# Patient Record
Sex: FEMALE | Race: BLACK OR AFRICAN AMERICAN | NOT HISPANIC OR LATINO | URBAN - METROPOLITAN AREA
[De-identification: names, ages, dates, MRNs, and addresses within clinical notes are randomized per-mention and may not be internally consistent; named-entity substitution may affect disease eponyms.]

---

## 2023-03-09 ENCOUNTER — TELEPHONE (OUTPATIENT)
Dept: ORTHOPEDICS | Facility: CLINIC | Age: 53
End: 2023-03-09
Payer: COMMERCIAL

## 2023-03-09 DIAGNOSIS — M79.659 PAIN OF THIGH, UNSPECIFIED LATERALITY: Primary | ICD-10-CM

## 2023-03-10 ENCOUNTER — OFFICE VISIT (OUTPATIENT)
Dept: ORTHOPEDICS | Facility: CLINIC | Age: 53
End: 2023-03-10
Payer: COMMERCIAL

## 2023-03-10 ENCOUNTER — HOSPITAL ENCOUNTER (OUTPATIENT)
Dept: RADIOLOGY | Facility: HOSPITAL | Age: 53
Discharge: HOME | End: 2023-03-10
Attending: ORTHOPAEDIC SURGERY
Payer: COMMERCIAL

## 2023-03-10 VITALS — BODY MASS INDEX: 44.41 KG/M2 | HEIGHT: 68 IN | WEIGHT: 293 LBS

## 2023-03-10 DIAGNOSIS — G89.29 CHRONIC PAIN OF LEFT KNEE: ICD-10-CM

## 2023-03-10 DIAGNOSIS — G89.29 CHRONIC PAIN OF LEFT KNEE: Primary | ICD-10-CM

## 2023-03-10 DIAGNOSIS — M17.12 PRIMARY OSTEOARTHRITIS OF LEFT KNEE: ICD-10-CM

## 2023-03-10 DIAGNOSIS — E66.01 CLASS 3 SEVERE OBESITY WITH SERIOUS COMORBIDITY AND BODY MASS INDEX (BMI) OF 45.0 TO 49.9 IN ADULT, UNSPECIFIED OBESITY TYPE: ICD-10-CM

## 2023-03-10 DIAGNOSIS — M25.562 CHRONIC PAIN OF LEFT KNEE: Primary | ICD-10-CM

## 2023-03-10 DIAGNOSIS — M25.562 CHRONIC PAIN OF LEFT KNEE: ICD-10-CM

## 2023-03-10 DIAGNOSIS — M79.659 PAIN OF THIGH, UNSPECIFIED LATERALITY: ICD-10-CM

## 2023-03-10 DIAGNOSIS — M76.32 ILIOTIBIAL BAND SYNDROME OF LEFT SIDE: ICD-10-CM

## 2023-03-10 PROCEDURE — 73552 X-RAY EXAM OF FEMUR 2/>: CPT | Mod: 26,50,, | Performed by: RADIOLOGY

## 2023-03-10 PROCEDURE — 73552 X-RAY EXAM OF FEMUR 2/>: CPT | Mod: TC,50

## 2023-03-10 PROCEDURE — 20610 DRAIN/INJ JOINT/BURSA W/O US: CPT | Mod: LT,S$GLB,, | Performed by: ORTHOPAEDIC SURGERY

## 2023-03-10 PROCEDURE — 73564 X-RAY EXAM KNEE 4 OR MORE: CPT | Mod: TC,LT

## 2023-03-10 PROCEDURE — 1159F PR MEDICATION LIST DOCUMENTED IN MEDICAL RECORD: ICD-10-PCS | Mod: CPTII,S$GLB,, | Performed by: ORTHOPAEDIC SURGERY

## 2023-03-10 PROCEDURE — 73564 X-RAY EXAM KNEE 4 OR MORE: CPT | Mod: 26,LT,, | Performed by: RADIOLOGY

## 2023-03-10 PROCEDURE — 99204 PR OFFICE/OUTPT VISIT, NEW, LEVL IV, 45-59 MIN: ICD-10-PCS | Mod: 25,S$GLB,, | Performed by: ORTHOPAEDIC SURGERY

## 2023-03-10 PROCEDURE — 99204 OFFICE O/P NEW MOD 45 MIN: CPT | Mod: 25,S$GLB,, | Performed by: ORTHOPAEDIC SURGERY

## 2023-03-10 PROCEDURE — 20610 LARGE JOINT ASPIRATION/INJECTION: L KNEE: ICD-10-PCS | Mod: LT,S$GLB,, | Performed by: ORTHOPAEDIC SURGERY

## 2023-03-10 PROCEDURE — 73564 XR KNEE ORTHO LEFT WITH FLEXION: ICD-10-PCS | Mod: 26,LT,, | Performed by: RADIOLOGY

## 2023-03-10 PROCEDURE — 3008F BODY MASS INDEX DOCD: CPT | Mod: CPTII,S$GLB,, | Performed by: ORTHOPAEDIC SURGERY

## 2023-03-10 PROCEDURE — 73562 X-RAY EXAM OF KNEE 3: CPT | Mod: 26,RT,, | Performed by: RADIOLOGY

## 2023-03-10 PROCEDURE — 99999 PR PBB SHADOW E&M-EST. PATIENT-LVL IV: ICD-10-PCS | Mod: PBBFAC,,, | Performed by: ORTHOPAEDIC SURGERY

## 2023-03-10 PROCEDURE — 73552 XR FEMUR 2 VIEW BILATERAL: ICD-10-PCS | Mod: 26,50,, | Performed by: RADIOLOGY

## 2023-03-10 PROCEDURE — 4010F PR ACE/ARB THEARPY RXD/TAKEN: ICD-10-PCS | Mod: CPTII,S$GLB,, | Performed by: ORTHOPAEDIC SURGERY

## 2023-03-10 PROCEDURE — 1159F MED LIST DOCD IN RCRD: CPT | Mod: CPTII,S$GLB,, | Performed by: ORTHOPAEDIC SURGERY

## 2023-03-10 PROCEDURE — 4010F ACE/ARB THERAPY RXD/TAKEN: CPT | Mod: CPTII,S$GLB,, | Performed by: ORTHOPAEDIC SURGERY

## 2023-03-10 PROCEDURE — 73562 XR KNEE ORTHO LEFT WITH FLEXION: ICD-10-PCS | Mod: 26,RT,, | Performed by: RADIOLOGY

## 2023-03-10 PROCEDURE — 3008F PR BODY MASS INDEX (BMI) DOCUMENTED: ICD-10-PCS | Mod: CPTII,S$GLB,, | Performed by: ORTHOPAEDIC SURGERY

## 2023-03-10 PROCEDURE — 99999 PR PBB SHADOW E&M-EST. PATIENT-LVL IV: CPT | Mod: PBBFAC,,, | Performed by: ORTHOPAEDIC SURGERY

## 2023-03-10 RX ORDER — FLUTICASONE PROPIONATE 50 MCG
1 SPRAY, SUSPENSION (ML) NASAL
COMMUNITY

## 2023-03-10 RX ORDER — CHOLESTYRAMINE 4 G/4.8G
1 POWDER, FOR SUSPENSION ORAL
COMMUNITY

## 2023-03-10 RX ORDER — ESCITALOPRAM OXALATE 5 MG/1
TABLET, FILM COATED ORAL
COMMUNITY
Start: 2023-02-28

## 2023-03-10 RX ORDER — IRBESARTAN 300 MG/1
TABLET ORAL
COMMUNITY
Start: 2021-12-09

## 2023-03-10 RX ORDER — EPINEPHRINE 0.3 MG/.3ML
INJECTION SUBCUTANEOUS
COMMUNITY
Start: 2022-12-23

## 2023-03-10 RX ORDER — ALPRAZOLAM 0.25 MG/1
1 TABLET ORAL NIGHTLY PRN
COMMUNITY
Start: 2022-11-07

## 2023-03-10 RX ORDER — BUDESONIDE 0.5 MG/2ML
INHALANT ORAL
COMMUNITY
Start: 2023-01-20

## 2023-03-10 RX ORDER — DESONIDE 0.5 MG/G
1 CREAM TOPICAL 2 TIMES DAILY
COMMUNITY
Start: 2022-12-15

## 2023-03-10 RX ORDER — MONTELUKAST SODIUM 10 MG/1
TABLET ORAL
COMMUNITY

## 2023-03-10 RX ORDER — PREDNISOLONE ACETATE 10 MG/ML
1 SUSPENSION/ DROPS OPHTHALMIC 2 TIMES DAILY
COMMUNITY
Start: 2023-02-28

## 2023-03-10 RX ORDER — CLINDAMYCIN AND BENZOYL PEROXIDE 10; 50 MG/G; MG/G
1 GEL TOPICAL
COMMUNITY
Start: 2022-12-15

## 2023-03-10 RX ORDER — CETIRIZINE HYDROCHLORIDE 10 MG/1
CAPSULE, LIQUID FILLED ORAL
COMMUNITY

## 2023-03-10 RX ORDER — CHOLECALCIFEROL (VITAMIN D3) 25 MCG
1 TABLET ORAL EVERY MORNING
COMMUNITY

## 2023-03-10 RX ORDER — MUPIROCIN 20 MG/G
OINTMENT TOPICAL
COMMUNITY
Start: 2023-01-26

## 2023-03-10 RX ORDER — TRIAMTERENE/HYDROCHLOROTHIAZID 37.5-25 MG
TABLET ORAL
COMMUNITY
Start: 2022-06-02

## 2023-03-10 RX ORDER — METHYLPREDNISOLONE ACETATE 80 MG/ML
80 INJECTION, SUSPENSION INTRA-ARTICULAR; INTRALESIONAL; INTRAMUSCULAR; SOFT TISSUE
Status: DISCONTINUED | OUTPATIENT
Start: 2023-03-10 | End: 2023-03-10 | Stop reason: HOSPADM

## 2023-03-10 RX ORDER — AMLODIPINE BESYLATE 5 MG/1
5 TABLET ORAL DAILY
COMMUNITY
Start: 2022-05-20

## 2023-03-10 RX ADMIN — METHYLPREDNISOLONE ACETATE 80 MG: 80 INJECTION, SUSPENSION INTRA-ARTICULAR; INTRALESIONAL; INTRAMUSCULAR; SOFT TISSUE at 08:03

## 2023-03-10 NOTE — PROGRESS NOTES
Patient ID: Corie Carreno  YOB: 1970  MRN: [id number]    Chief Complaint: Pain of the Left Knee      Referred By: Referred by coworker     History of Present Illness: Corie Carreno is a 52 y.o. female    with a chief complaint of Pain of the Left Knee  Patient presents to clinic with 2/10 pain in the left knee. Experiences stiffness and difficulty with flexion. Also has clicking and popping with ambulation. Reports an aching pain on the medial side of the knee. She has been treated previously MARISA Flores, Dr. Bunch's PA at [facility] in 2022. Treatment included PT at [facility] with slight improvement. Pain currently takes tylenol for pain.     HPI    Past Medical History:   Past Medical History:   Diagnosis Date    Arthritis     Hypertension     Spondylolisthesis      Past Surgical History:   Procedure Laterality Date    CHOLECYSTECTOMY      HYSTERECTOMY      TONSILLECTOMY       Family History   Problem Relation Age of Onset    Diabetes Mother     Cancer Mother      Social History     Socioeconomic History    Marital status:    Tobacco Use    Smoking status: Never    Smokeless tobacco: Never       Review of patient's allergies indicates:   Allergen Reactions    Doxycycline Other (See Comments)     Other reaction(s): Headache      Adhesive Blisters, Itching and Rash    Sulfa (sulfonamide antibiotics) Hives and Rash     ROS    Physical Exam:   Body mass index is 46.68 kg/m².  There were no vitals filed for this visit.   GENERAL: Well appearing, appropriate for stated age, no acute distress.  CARDIOVASCULAR: Pulses regular by peripheral palpation.  PULMONARY: Respirations are even and non-labored.  NEURO: Awake, alert, and oriented x 3.  PSYCH: Mood & affect are appropriate.  HEENT: Head is normocephalic and atraumatic.              Left Knee Exam     Inspection   Scars: present  Effusion: absent    Tenderness   The patient tender to palpation of the medial joint line and iliotibial band.    Range  of Motion   Extension:  0   Flexion:  110     Tests   Meniscus   Ekta:  Medial - positive   Stability   Lachman: normal (-1 to 2mm)   MCL - Valgus: normal (0 to 2mm)  LCL - Varus: normal (0 to 2mm)    Other   Sensation: normal    Comments:  Intact EHL, FHL, gastrocsoleus, and tibialis anterior. Sensation intact to light touch in superficial peroneal, deep peroneal, tibial, sural, and saphenous nerve distributions. Foot warm and well perfused with capillary refill of less than 2 seconds and palpable pedal pulses.  Left Hip Exam     Tests   Darcy: positive          Muscle Strength   Left Lower Extremity   Hip Abduction: 4/5 (pain)   Quadriceps:  4/5   Hamstrin/5     Vascular Exam       Left Pulses  Dorsalis Pedis:      2+  Posterior Tibial:      2+          Imaging:    X-ray Knee Ortho Left with Flexion  Narrative: EXAMINATION:  XR KNEE ORTHO LEFT WITH FLEXION    CLINICAL HISTORY:  Pain in left knee    TECHNIQUE:  AP standing views of both knees, AP flexion views of both knees, lateral view of the left knee and Merchant views of both knees    COMPARISON:  None    FINDINGS:  There is mild joint space narrowing the medial compartment of the left knee and more mild-to-moderate joint space narrowing and osteophyte formation seen involving medial compartment of the right knee.  Minimal degenerative change involving the left patellofemoral compartment.  A small to moderate-sized suprapatellar joint effusion is seen on the left.  No acute fracture or dislocation.  Impression: 1.  As above    Electronically signed by: Ruiz Arizmendi DO  Date:    03/10/2023  Time:    09:20  X-Ray Femur 2 View Bilateral  Narrative: EXAMINATION:  XR FEMUR 2 VIEW BILATERAL    CLINICAL HISTORY:  Pain in unspecified thigh    TECHNIQUE:  Two views of either femur, four views total    COMPARISON:  None    FINDINGS:  Both femurs are intact.  No obvious lytic or sclerotic lesions are identified.  No obvious soft tissue swelling  noted.  Impression: As above    Electronically signed by: Ruiz Arizmendi DO  Date:    03/10/2023  Time:    08:20      Relevant imaging results reviewed and interpreted by me, discussed with the patient and / or family today.     Other Tests:         Patient Instructions   Assessment:  Corie Carreno is a  52 y.o. female    with a chief complaint of Pain of the Left Knee    Left knee early OA  Distal IT Band syndrome     Encounter Diagnoses   Name Primary?    Chronic pain of left knee Yes    Iliotibial band syndrome of left side     Primary osteoarthritis of left knee     Class 3 severe obesity with serious comorbidity and body mass index (BMI) of 45.0 to 49.9 in adult, unspecified obesity type       Plan:  Left knee CSI 2/2/40  Order PT at [place] - 2-3x per week for 6-8 weeks  Possible MRI if no improvement     Although there is not a cure for arthritis, there are effective ways to improve symptoms.     Exercise & Activity:  I recommend low impact activities such as elliptical and bicycle   Walking is great for arthritis: https://www.Pearl.com.com/3-reasons-walking-with-knee-arthritis/  If walking long distances, I recommend good quality well-cushioned shoes. Varsity sports can help you find the right ones: https://www.PicarrosityBonzerDarg.Agnitus/  Aquatic and pool therapy is often helpful because it lessens the impact on the joint, strengthens the leg and thigh muscles, and helps to control swelling.   Knee motion is important to the health of the knee.     Knee Braces:  A compression knee sleeve can help limit swelling and provide proprioceptive feedback.     Prescriptions & Medications:  I do recommend formal physical therapy or at minimum a home exercise program.   Over the counter analgesic (pain-relieving) medications can help. Examples are Tylenol, Ibuprofen, and Aleve. Check with your primary care physician to make sure you don't have contra-indications to taking those medicines.  Some over the counter  supplement solutions such as glucosamine and chondroitin may help with symptoms, although the evidence is mixed.    Healthy Lifestyle:  Excess body weight can have a negative impact on joint health and on pain. I recommend healthy lifestyle choices including nutrition and exercise that help reach and maintain an ideal body weight. Tips for Exercise: https://www.thePlatform.Deolan/13-exercise-tips-for-a-healthier-you/  Some diets cause increased inflammation. I recommend a balanced wholesome diet including some foods such as olives that are shown to decrease inflammation. More diet information available here: https://www.thePlatform.Deolan/8-best-foods-for-knee-arthritis/     Follow-up: 6 weeks or sooner if there are any problems between now and then.    Leave Review:   Google: Leave Google Review  Healthgrades: Leave Healthgrades Review    After Hours Number: [phone]        Provider Note/Medical Decision Making:       I discussed worrisome and red flag signs and symptoms with the patient. The patient expressed understanding and agreed to alert me immediately or to go to the emergency room if they experience any of these.   Treatment plan was developed with input from the patient/family, and they expressed understanding and agreement with the plan. All questions were answered today.          Callum Lynn MD  Orthopaedic Surgery & Sports Medicine       Disclaimer: This note was prepared using a voice recognition system and is likely to have sound alike errors within the text.     I, Minal Laguerre, acted as a scribe for Callum Lynn MD for the duration of this office visit.

## 2023-03-10 NOTE — PATIENT INSTRUCTIONS
Assessment:  Corie Carreno is a  52 y.o. female    with a chief complaint of Pain of the Left Knee    Left knee early OA  Distal IT Band syndrome     Encounter Diagnoses   Name Primary?    Chronic pain of left knee Yes    Iliotibial band syndrome of left side     Primary osteoarthritis of left knee     Class 3 severe obesity with serious comorbidity and body mass index (BMI) of 45.0 to 49.9 in adult, unspecified obesity type       Plan:  Left knee CSI 2/2/40  Order PT at [place] - 2-3x per week for 6-8 weeks  Possible MRI if no improvement     Although there is not a cure for arthritis, there are effective ways to improve symptoms.     Exercise & Activity:  I recommend low impact activities such as elliptical and bicycle   Walking is great for arthritis: https://www.Mapluck.com/3-reasons-walking-with-knee-arthritis/  If walking long distances, I recommend good quality well-cushioned shoes. Varsity sports can help you find the right ones: https://www.[employer].Sports.ws/  Aquatic and pool therapy is often helpful because it lessens the impact on the joint, strengthens the leg and thigh muscles, and helps to control swelling.   Knee motion is important to the health of the knee.     Knee Braces:  A compression knee sleeve can help limit swelling and provide proprioceptive feedback.     Prescriptions & Medications:  I do recommend formal physical therapy or at minimum a home exercise program.   Over the counter analgesic (pain-relieving) medications can help. Examples are Tylenol, Ibuprofen, and Aleve. Check with your primary care physician to make sure you don't have contra-indications to taking those medicines.  Some over the counter supplement solutions such as glucosamine and chondroitin may help with symptoms, although the evidence is mixed.    Healthy Lifestyle:  Excess body weight can have a negative impact on joint health and on pain. I recommend healthy lifestyle choices including nutrition and  exercise that help reach and maintain an ideal body weight. Tips for Exercise: https://www.[employer] Wireles/13-exercise-tips-for-a-healthier-you/  Some diets cause increased inflammation. I recommend a balanced wholesome diet including some foods such as olives that are shown to decrease inflammation. More diet information available here: https://www.[employer]uan Wireles/8-best-foods-for-knee-arthritis/     Follow-up: 6 weeks or sooner if there are any problems between now and then.    Leave Review:   Google: Leave Google Review  Healthgrades: Leave Healthgrades Review    After Hours Number: [phone]

## 2023-03-10 NOTE — PROGRESS NOTES
Patient ID: Corie Carreno  YOB: 1970  MRN: [id number]    Chief Complaint: Pain of the Left Knee      Referred By: Referred by coworker     History of Present Illness: Corie Carreno is a 52 y.o. female    with a chief complaint of Pain of the Left Knee  Patient presents to clinic with 2/10 pain in the left knee. Experiences stiffness and difficulty with flexion. Also has clicking and popping with ambulation. Reports an aching pain on the medial side of the knee. She has been treated previously MARISA Flores, Dr. Bunch's PA at [facility] in 2022. Treatment included PT at [facility] with slight improvement. Pain currently takes tylenol for pain.     HPI    Past Medical History:   Past Medical History:   Diagnosis Date    Arthritis     Hypertension     Spondylolisthesis      Past Surgical History:   Procedure Laterality Date    CHOLECYSTECTOMY      HYSTERECTOMY      TONSILLECTOMY       Family History   Problem Relation Age of Onset    Diabetes Mother     Cancer Mother      Social History     Socioeconomic History    Marital status:    Tobacco Use    Smoking status: Never    Smokeless tobacco: Never       Review of patient's allergies indicates:   Allergen Reactions    Doxycycline Other (See Comments)     Other reaction(s): Headache      Adhesive Blisters, Itching and Rash    Sulfa (sulfonamide antibiotics) Hives and Rash     ROS    Physical Exam:   Body mass index is 46.68 kg/m².  There were no vitals filed for this visit.   GENERAL: Well appearing, appropriate for stated age, no acute distress.  CARDIOVASCULAR: Pulses regular by peripheral palpation.  PULMONARY: Respirations are even and non-labored.  NEURO: Awake, alert, and oriented x 3.  PSYCH: Mood & affect are appropriate.  HEENT: Head is normocephalic and atraumatic.  Ortho/SPM Exam  ***    Imaging:    X-Ray Femur 2 View Bilateral  Narrative: EXAMINATION:  XR FEMUR 2 VIEW BILATERAL    CLINICAL HISTORY:  Pain in unspecified  thigh    TECHNIQUE:  Two views of either femur, four views total    COMPARISON:  None    FINDINGS:  Both femurs are intact.  No obvious lytic or sclerotic lesions are identified.  No obvious soft tissue swelling noted.  Impression: As above    Electronically signed by: Ruiz Arizmendi DO  Date:    03/10/2023  Time:    08:20    ***  Relevant imaging results reviewed and interpreted by me, discussed with the patient and / or family today. ***    Other Tests:     ***    There are no Patient Instructions on file for this visit.  Provider Note/Medical Decision Making: ***      I discussed worrisome and red flag signs and symptoms with the patient. The patient expressed understanding and agreed to alert me immediately or to go to the emergency room if they experience any of these.   Treatment plan was developed with input from the patient/family, and they expressed understanding and agreement with the plan. All questions were answered today.          Callum Lynn MD  Orthopaedic Surgery & Sports Medicine       Disclaimer: This note was prepared using a voice recognition system and is likely to have sound alike errors within the text.

## 2023-03-10 NOTE — PROCEDURES
Large Joint Aspiration/Injection: L knee    Date/Time: 3/10/2023 8:30 AM  Performed by: Callum Lynn MD  Authorized by: Callum Lynn MD     Consent Done?:  Yes (Verbal)  Indications:  Joint swelling and pain  Site marked: the procedure site was marked    Timeout: prior to procedure the correct patient, procedure, and site was verified    Prep: patient was prepped and draped in usual sterile fashion      Local anesthesia used?: Yes    Anesthesia:  Local infiltration  Local anesthetic:  Bupivacaine 0.5% without epinephrine, lidocaine 1% without epinephrine and topical anesthetic    Details:  Needle Size:  22 G  Ultrasonic Guidance for needle placement?: No    Approach:  Superior  Location:  Knee  Site:  L knee  Medications:  80 mg methylPREDNISolone acetate 80 mg/mL  Patient tolerance:  Patient tolerated the procedure well with no immediate complications     2cc 1% lidocaine plain, 2cc 0.5% marcaine plain, 0.5cc 80mg methylprednisolone    Procedure Note:  We discussed the risk and benefits of injections, including pain, infection, bleeding, damage to adjacent structures, risk of reaction to injection. We discussed the steroid/cortisone injections will not heal the problem but mat help decrease inflammation and help with symptoms. We discussed the risk of repeated injections. The patient expressed understanding and wanted to proceed with the injection. We performed a timeout to verify the proper patient, proper procedure, and the proper site. The injection site was prepared in a sterile fashion. The patient tolerated it well and there were no complication. We did discuss with the patient that steroid injections can cause some increase in blood sugar and blood pressure for up to a week after the injection.

## 2023-03-13 ENCOUNTER — CLINICAL SUPPORT (OUTPATIENT)
Dept: REHABILITATION | Facility: HOSPITAL | Age: 53
End: 2023-03-13
Attending: ORTHOPAEDIC SURGERY
Payer: COMMERCIAL

## 2023-03-13 DIAGNOSIS — M25.562 CHRONIC PAIN OF LEFT KNEE: ICD-10-CM

## 2023-03-13 DIAGNOSIS — G89.29 CHRONIC PAIN OF LEFT KNEE: ICD-10-CM

## 2023-03-13 DIAGNOSIS — M25.662 DECREASED RANGE OF MOTION OF LEFT KNEE: ICD-10-CM

## 2023-03-13 DIAGNOSIS — M76.32 ILIOTIBIAL BAND SYNDROME OF LEFT SIDE: ICD-10-CM

## 2023-03-13 DIAGNOSIS — M62.81 PROXIMAL MUSCLE WEAKNESS: ICD-10-CM

## 2023-03-13 DIAGNOSIS — Z74.09 DECREASED FUNCTIONAL MOBILITY AND ENDURANCE: ICD-10-CM

## 2023-03-13 DIAGNOSIS — M17.12 PRIMARY OSTEOARTHRITIS OF LEFT KNEE: ICD-10-CM

## 2023-03-13 PROCEDURE — 97162 PT EVAL MOD COMPLEX 30 MIN: CPT

## 2023-03-13 PROCEDURE — 97110 THERAPEUTIC EXERCISES: CPT

## 2023-03-13 NOTE — PLAN OF CARE
[facility]   Physical Therapy Initial Evaluation   Date: 3/13/2023   Name: Corie Carreno  Clinic Number: [id number]    Therapy Diagnosis:    Encounter Diagnoses   Name Primary?    Chronic pain of left knee     Iliotibial band syndrome of left side     Primary osteoarthritis of left knee     Decreased functional mobility and endurance     Proximal muscle weakness     Decreased range of motion of left knee       Physician: Callum Lynn MD     Physician Orders: PT Eval and Treat  Medical Diagnosis from Referral: Chronic pain of left knee, Iliotibial band syndrome of left side, Primary osteoarthritis of left knee  Evaluation Date: 3/13/2023  Authorization Period Expiration: 3/9/24  Plan of Care Expiration: 5/13/2023  Progress Note Due: 4/13/2023  Visit # / Visits authorized: 1/1   FOTO: 1/3 (last performed on 3/13/2023)    Precautions: Standard    Time In: 1105  Time Out: 1203  Total Billable Time (timed & untimed codes): 55 minutes    SUBJECTIVE   Date of onset: ~6 months ago     History of current condition - Terra reports L knee pain for 6 months. At work and noticed insidious L knee pain. By mid day she was limping and by end of day she was in bad pain that she had never felt before. Went to an after hours clinic and diagnosed with ITB syndrome. Was using a crutch at one point because it hurt to put weight on the leg. She did therapy for a couple of weeks and continued at home because they mostly did stretches which she felt she could do independently. Pain improved but feels like her knee has never been the same. Now getting popping on the side and across the top of the knee. Also notes tenderness to palpation at medial knee. Increased stiffness which is worse at the end of the day. Sedentary job but tries to make an effort to get up and move around to help with stiffness.     Also reports history of bulging discs and arthritis in the back (L3-L5 disc bulges). Periodically gets MIKAL  in the back; for the last 18 years. When symptoms would worsen she would have to stop activity due to aching pain from her waist all the way down. Would also get numbness and tingling into the big toe and second toe, mainly on the left side. Has recently been having numbness in the toes for the last 3 months. Scheduled for an MIKAL on march 30th.     Imaging: [x] Xray [] MRI [] CT: Performed on: 3/10/2023    Pain:  Current 4/10, worst 8/10, best 2/10   Location: [] Right   [x] Left:  knee (lateral knee, superior patella)   Description: ache, popping on the lateral knee and above the knee cap, giving out initially but not often now   Aggravating Factors: getting in and out of the tub, walking, weight bearing,   Easing Factors: activity avoidance, rest, ice, stretching, tylenol as needed     Prior Therapy:   [] N/A    [x] Yes: (ITB stretching mostly, knee extensions)   Social History: Pt lives with their family  Occupation: Pt is an  (sedentary)   Prior Level of Function: Independent and pain free with all ADL, IADL, community mobility and functional activities.   Current Level of Function: Independent with all ADL, IADL, community mobility and functional activities with reports of increased pain and need for increased time and frequent breaks. Difficulty crossing her ankle over the opposite leg. Avoids stairs due to pain.     Dominant Extremity:    [x] Right    [] Left    Pts goals: Pt reported goals are to decrease overall pain levels in order to return to prior functional level.     Medical History:   Past Medical History:   Diagnosis Date    Arthritis     Hypertension     Spondylolisthesis        Surgical History:   Corie Carreno  has a past surgical history that includes Hysterectomy; Tonsillectomy; and Cholecystectomy.    Medications:   Corie has a current medication list which includes the following prescription(s): alprazolam, amlodipine, budesonide, zyrtec, cholestyramine-aspartame, clindamycin-benzoyl  peroxide, desonide, epinephrine, fluticasone propionate, folic acid/vit b complex and c, irbesartan, lexapro, montelukast, multivit,calc,mins/iron/folic, mupirocin, prednisolone acetate, triamterene-hydrochlorothiazide 37.5-25 mg, and vitamin d.    Allergies:   Review of patient's allergies indicates:   Allergen Reactions    Doxycycline Other (See Comments)     Other reaction(s): Headache      Adhesive Blisters, Itching and Rash    Sulfa (sulfonamide antibiotics) Hives and Rash        OBJECTIVE     Range of Motion:    Hip AROM/PROM Right Left Pain/Dysfunction with Movement Goal   Hip Flexion (120º) 100 100     Hip Extension (30º) NT NT     Hip Abduction (45º) 35 35     Hip IR (45º) 15 15     Hip ER (45º) 45 35  45    ,   Knee AROM/PROM Right Left Pain/Dysfunction with Movement Goal   Knee Flexion (135º) 115 110 Bradykinetic and reports of stiffness on the L  115   Knee Extension (0º) 0 5           Strength:    L/E MMT Right  (spine) Left Pain/Dysfunction with Movement Goal   Modified (90/90) Abdominal Strength  poor ---     Hip Flexion  4/5 4/5  4+/5 B   Hip Extension  NT NT  4+/5 B   Hip Abduction  4-/5 4-/5  4+/5 B   Knee Extension 5/5 4/5 Pain on L  5/5 B     Knee Flexion 4/5 4/5  5/5 B   Hip IR 4/5 4/5  4+/5 B   Hip ER 4/5 4/5  4+/5 B   Ankle DF 4+/5 4+/5  5/5 B   Ankle PF 4+/5 4+/5 Poor vertical displacement with calf raises  5/5 B        Muscle Length:       Muscle Tested  Right Left  Limitation Goal   ITB / TFL [] Normal      [x] Limited [] Normal      [x] Limited     Hamstrings  [x] Normal      [] Limited [] Normal      [x] Limited  Normal B   Piriformis  [x] Normal      [] Limited [] Normal      [x] Limited  Normal B   Gastrocnemius  [x] Normal      [] Limited [] Normal      [x] Limited  Normal B        Joint Mobility:     Joint Motion Right Mobility  (spine) Left Mobility Goal   Knee AP  [x] Hypo     [] Normal     [] Hyper [] Hypo     [x] Normal     [] Hyper Normal    Patellar Medial Glide  [x] Hypo     []  Normal     [] Hyper [x] Hypo     [x] Normal     [] Hyper Normal    Patellar Lateral Glide  [x] Hypo     [] Normal     [] Hyper [x] Hypo     [x] Normal     [] Hyper Normal    Patellar Superior Glide  [x] Hypo     [] Normal     [] Hyper [x] Hypo     [x] Normal     [] Hyper Normal    Patellar Inferior Glide  [x] Hypo     [] Normal     [] Hyper [x] Hypo     [x] Normal     [] Hyper Normal      Sensation:  [x] Intact to Light Touch   [] Impaired:    Palpation: Increased tone and tenderness noted with palpation of left quadriceps, medial hamstrings, and hip adductors . Increased tenderness with palpation of the following structures: ITB and pes anserine      Posture:  Pt presents with postural abnormalities which include:    [x] Forward Head   [x] Increased Lumbar Lordosis   [x] Rounded Shoulder   [] Genu Recurvatum   [x] Increased Thoracic Kyphosis [] Genu Valgus   [] Trunk Deviated    [] Pes Planus   [] Scapular Winging    [] Other:       Function:     CMS Impairment/Limitation/Restriction for FOTO Knee Survey    Therapist reviewed FOTO scores for Terra on 3/13/2023.   FOTO documents entered into "[employer] - see Media section.    Limitation Score: 51%         TREATMENT     Total Treatment time (time-based codes) separate from Evaluation: (8) minutes     Corie received the treatments listed below:          THERAPEUTIC EXERCISES to develop strength, endurance, ROM, flexibility, posture, and core stabilization for (8) minutes including:    Intervention Performed Today    ITB stretch  x 2x30s   Seated hamstring stretch  x 2x30s   Seated adductor stretch  x 2x30s   Seated piriformis stretch  x 2x30s                         Plan for Next Visit: posterior pelvic tilt, bridges, clamshells, quadricep sets,        PATIENT EDUCATION AND HOME EXERCISES     Education provided: (time included with therex)   PURPOSE: Patient educated on the impairments noted above and the effects of physical therapy intervention to improve overall condition  and QOL.   EXERCISE: Patient was educated on all the above exercise prior/during/after for proper posture, positioning, and execution for safe performance with home exercise program.   STRENGTH: Patient educated on the importance of improved core and extremity strength in order to improve alignment of the spine and extremities with static positions and dynamic movement.   POSTURE: Patient educated on postural awareness to reduce stress and maintain optimal alignment of the spine with static positions and dynamic movement     Written Home Exercises Provided: yes.  Exercises were reviewed and Corie was able to demonstrate them prior to the end of the session.  Corie demonstrated good  understanding of the education provided. See EMR under Patient Instructions for exercises provided during therapy sessions.    ASSESSMENT     Corie is a 52 y.o. female referred to outpatient Physical Therapy with a medical diagnosis of Chronic pain of left knee, Iliotibial band syndrome of left side, Primary osteoarthritis of left knee. Pt presents with impairments including: decreased ROM, decreased strength, decreased muscle length, impaired balance, and decreased overall function. Patient also has a history of low back injury with multiple disc injuries which could also cause symptoms in a similar distribution throughout the leg. This will be assessed throughout the course of her care and can incorporate core stability interventions as necessary to help manage syptoms     Pt prognosis is Good.   Pt will benefit from skilled outpatient Physical Therapy to address the deficits stated above and in the chart below, provide pt/family education, and to maximize pt's level of independence.     Plan of care discussed with patient: Yes  Pt's spiritual, cultural and educational needs considered and patient is agreeable to the plan of care and goals as stated below:     Anticipated Barriers for therapy: co-morbidities, chronicity of condition,  "and adherence to treatment plan    Medical Necessity is demonstrated by the following  History  Co-morbidities and personal factors that may impact the plan of care Co-morbidities:   high BMI    Personal Factors:   []Age   []Education   []Coping style   []Social background   [x]Lifestyle    []Character   []Attitudes   []Other:   []No deficits      []Low   [x]Moderate  []High    Examination  Body Structures and Functions, activity limitations and participation restrictions that may impact the plan of care Body Regions:   []Head  []Neck   [x]Back   [x]Trunk  []Upper extremities   [x]Lower extremities   []Other:      Body Systems:    []Gross symmetry   [x]ROM   [x]Strength   []Gross coordinated movement   [x]Balance   []Gait []Transfers  []Transitions   [x]Motor control   []Motor learning   []Scar formation  []Other:       Participation Restrictions:   See above in "Current Level of Function"     Activity limitations:   Learning and applying knowledge  [x]No deficits       General Tasks and Commands  [x]No deficits    Communication  [x]No deficits    Mobility   []No deficits  []Fine hand use  [x]Walking   []Driving [x]Lifting/carrying objects  []Using Transportation   []Moving around using equipment  [x]Other: bending, stairs     Self care  []No deficits  []Washing oneself   [x]Caring for body parts   [x]Toileting   []Dressing  []Eating   []Drinking   [x]Looking after one's health  []Other:        Domestic Life  []No Deficits  [x]Shopping   [x]Cooking  [x]Doing housework  [x]Assisting others   []Other:      Interactions/Relationships  [x]No deficits    Life Areas  [x]No deficits    Community and Social Life  [x]Community life  [x]Recreation and leisure   []Mormonism and spirituality  []Human rights   []Political life / citizenship  []No deficits      []Low   []Moderate  [x]High    Clinical Presentation []Stable and uncomplicated   [x]Evolving presentation with changing characteristics  []Unstable presentation with " unpredictable characteristics []Low   [x]Moderate  []High      Decision Making/ Complexity Score:   []Low   [x]Moderate  []High        Short Term Goals:  4 weeks Status  Date Met   PAIN: Pt will report worst pain of 5/10 in order to progress toward max functional ability and improve quality of life. [x] Progressing  [] Met  [] Not Met    FUNCTION: Patient will demonstrate improved function as indicated by a functional limitation score of less than or equal to 45 out of 100 on FOTO. [x] Progressing  [] Met  [] Not Met    MOBILITY: Patient will improve AROM to 50% of stated goals, listed in objective measures above, in order to progress towards independence with functional activities.  [x] Progressing  [] Met  [] Not Met    STRENGTH: Patient will improve strength to 50% of stated goals, listed in objective measures above, in order to progress towards independence with functional activities.  [x] Progressing  [] Met  [] Not Met    POSTURE: Patient will correct postural deviations in sitting and standing, to decrease pain and promote long term stability.  [x] Progressing  [] Met  [] Not Met    HEP: Patient will demonstrate independence with HEP in order to progress toward functional independence. [x] Progressing  [] Met  [] Not Met      Long Term Goals:  8 weeks Status Date Met   PAIN: Pt will report worst pain of 2/10 in order to progress toward max functional ability and improve quality of life [x] Progressing  [] Met  [] Not Met    FUNCTION: Patient will demonstrate improved function as indicated by a functional limitation score of less than or equal to 39 out of 100 on FOTO. [x] Progressing  [] Met  [] Not Met    MOBILITY: Patient will improve AROM to stated goals, listed in objective measures above, in order to return to maximal functional potential and improve quality of life. [x] Progressing  [] Met  [] Not Met    STRENGTH: Patient will improve strength to stated goals, listed in objective measures above, in  order to improve functional independence and quality of life. [x] Progressing  [] Met  [] Not Met    Patient will return to normal ADL's, IADL's, community involvement, recreational activities, and work-related activities with less than or equal to 2/10 pain and maximal function.  [x] Progressing  [] Met  [] Not Met      PLAN   Plan of care Certification: 3/13/2023 to 5/13/2023.    Outpatient Physical Therapy 2 times weekly for 8 weeks to include any combination of the following interventions: virtual visits, dry needling, modalities, electrical stimulation (IFC, Pre-Mod, Attended with Functional Dry Needling), Cervical/Lumbar Traction, Gait Training, Manual Therapy, Neuromuscular Re-ed, Patient Education, Self Care, Therapeutic Exercise, and Therapeutic Activites     Yanni Oh, PT, DPT      I CERTIFY THE NEED FOR THESE SERVICES FURNISHED UNDER THIS PLAN OF TREATMENT AND WHILE UNDER MY CARE   Physician's comments:     Physician's Signature: ___________________________________________________

## 2023-03-15 PROBLEM — Z74.09 DECREASED FUNCTIONAL MOBILITY AND ENDURANCE: Status: ACTIVE | Noted: 2023-03-15

## 2023-03-15 PROBLEM — M62.81 PROXIMAL MUSCLE WEAKNESS: Status: ACTIVE | Noted: 2023-03-15

## 2023-03-15 PROBLEM — M25.662 DECREASED RANGE OF MOTION OF LEFT KNEE: Status: ACTIVE | Noted: 2023-03-15

## 2023-03-17 ENCOUNTER — CLINICAL SUPPORT (OUTPATIENT)
Dept: REHABILITATION | Facility: HOSPITAL | Age: 53
End: 2023-03-17
Payer: COMMERCIAL

## 2023-03-17 DIAGNOSIS — M25.662 DECREASED RANGE OF MOTION OF LEFT KNEE: ICD-10-CM

## 2023-03-17 DIAGNOSIS — Z74.09 DECREASED FUNCTIONAL MOBILITY AND ENDURANCE: Primary | ICD-10-CM

## 2023-03-17 DIAGNOSIS — M62.81 PROXIMAL MUSCLE WEAKNESS: ICD-10-CM

## 2023-03-17 PROCEDURE — 97112 NEUROMUSCULAR REEDUCATION: CPT

## 2023-03-17 PROCEDURE — 97530 THERAPEUTIC ACTIVITIES: CPT

## 2023-03-17 PROCEDURE — 97110 THERAPEUTIC EXERCISES: CPT

## 2023-03-17 NOTE — PROGRESS NOTES
[facility]   Physical Therapy Treatment Note     Name: Corie Carreno  Clinic Number: [id number]    Therapy Diagnosis:   Encounter Diagnoses   Name Primary?    Decreased functional mobility and endurance Yes    Proximal muscle weakness     Decreased range of motion of left knee      Physician: Callum Lynn MD    Visit Date: 3/17/2023    Physician Orders: PT Eval and Treat  Medical Diagnosis from Referral: Chronic pain of left knee, Iliotibial band syndrome of left side, Primary osteoarthritis of left knee  Evaluation Date: 3/13/2023  Authorization Period Expiration: 3/9/24  Plan of Care Expiration: 5/13/2023  Progress Note Due: 4/13/2023  Visit # / Visits authorized: 1/1   FOTO: 1/3 (last performed on 3/13/2023)     Precautions: Standard    Time In: 1330  Time Out: 1429  Total Billable Time: 53 minutes (Billing reflects 1 on 1 treatment time spent with patient)    SUBJECTIVE     Patient reports: she is feeling good today. States the stretches have been helping with her pain and mobility .    He/She was compliant with home exercise program.  Response to previous treatment: decreased pain   Functional change: improved knee flexion ROM and hamstring mobility     Pain: 1/10     Location: L knee    OBJECTIVE     Objective Measures updated at progress report only unless specified.       TREATMENT     Corie received the treatments listed below:         THERAPEUTIC EXERCISES to develop strength, endurance, ROM, flexibility, posture, and core stabilization for (22) minutes including:    Intervention Performed Today    Nu step  x Level 1 for 5 minutes    Stretches  X  X  X  x Hamstring (stool): 2x30s B   ITB (stool): 2x30s B   Piriformis (stool): 2x30s B   Adductor (seated): 2x30s B    LAQ x 5# 3x10 B    Heel slides  X  x With strap: 3 minutes with 10s holds   Without strap: 10x10s holds                              NEUROMUSCULAR RE-EDUCATION ACTIVITIES to improve Balance, Coordination,  Kinesthetic, Sense, Proprioception, and Posture for (23) minutes.  The following were included:    Intervention Performed Today    Posterior pelvic tilt  x 30x    March with posterior pelvic tilt  x 2x10 B    Clamshells  x 3x10 B    Tandem stance  x 2x30s B    Single leg stance  x 2x30s B                         THERAPEUTIC ACTIVITIES to improve dynamic and functional  performance for (8) minutes including:    Intervention Performed Today    Bridge with abdominal brace  x 3x10    Step ups  x 6 inch 2x10 B                                        PATIENT EDUCATION AND HOME EXERCISES     Home Exercises Provided and Patient Education Provided     Education provided: (time included with therex) minutes  PURPOSE: Patient educated on the impairments noted above and the effects of physical therapy intervention to improve overall condition and QOL.   EXERCISE: Patient was educated on all the above exercise prior/during/after for proper posture, positioning, and execution for safe performance with home exercise program.   STRENGTH: Patient educated on the importance of improved core and extremity strength in order to improve alignment of the spine and extremities with static positions and dynamic movement.   GAIT & BALANCE: Patient educated on the importance of strong core and lower extremity musculature in order to improve both static and dynamic balance, improve gait mechanics, reduce fall risk and improve household and community mobility.     Written Home Exercises Provided: yes.  Exercises were reviewed and Corie was able to demonstrate them prior to the end of the session.  Corie demonstrated good  understanding of the education provided. See EMR under Patient Instructions for exercises provided during therapy sessions.    ASSESSMENT     Pt tolerated session well today. Improved mobility noted with all stretches compared to previous session. Pt demonstrates symmetrical knee flexion following heel slides. Incorporated  bridges and step ups to improve functional mobility. Added posterior pelvic tilt and marches to improve core stability     Corie is progressing well towards her goals.   Pt prognosis is Good.     Pt will continue to benefit from skilled outpatient physical therapy to address the deficits listed in the problem list box on initial evaluation, provide pt/family education and to maximize pt's level of independence in the home and community environment.     Pt's spiritual, cultural and educational needs considered and pt agreeable to plan of care and goals.     Anticipated Barriers for therapy: co-morbidities, chronicity of condition, and adherence to treatment plan       Short Term Goals:  4 weeks Status  Date Met   PAIN: Pt will report worst pain of 5/10 in order to progress toward max functional ability and improve quality of life. [x] Progressing  [] Met  [] Not Met     FUNCTION: Patient will demonstrate improved function as indicated by a functional limitation score of less than or equal to 45 out of 100 on FOTO. [x] Progressing  [] Met  [] Not Met     MOBILITY: Patient will improve AROM to 50% of stated goals, listed in objective measures above, in order to progress towards independence with functional activities.  [x] Progressing  [] Met  [] Not Met     STRENGTH: Patient will improve strength to 50% of stated goals, listed in objective measures above, in order to progress towards independence with functional activities.  [x] Progressing  [] Met  [] Not Met     POSTURE: Patient will correct postural deviations in sitting and standing, to decrease pain and promote long term stability.  [x] Progressing  [] Met  [] Not Met     HEP: Patient will demonstrate independence with HEP in order to progress toward functional independence. [x] Progressing  [] Met  [] Not Met        Long Term Goals:  8 weeks Status Date Met   PAIN: Pt will report worst pain of 2/10 in order to progress toward max functional ability and improve  quality of life [x] Progressing  [] Met  [] Not Met     FUNCTION: Patient will demonstrate improved function as indicated by a functional limitation score of less than or equal to 39 out of 100 on FOTO. [x] Progressing  [] Met  [] Not Met     MOBILITY: Patient will improve AROM to stated goals, listed in objective measures above, in order to return to maximal functional potential and improve quality of life. [x] Progressing  [] Met  [] Not Met     STRENGTH: Patient will improve strength to stated goals, listed in objective measures above, in order to improve functional independence and quality of life. [x] Progressing  [] Met  [] Not Met     Patient will return to normal ADL's, IADL's, community involvement, recreational activities, and work-related activities with less than or equal to 2/10 pain and maximal function.  [x] Progressing  [] Met  [] Not Met         PLAN     Continue Plan of Care (POC) and progress per patient tolerance. See treatment section for details on planned progressions next session.      Yanni Oh, PT

## 2023-03-24 ENCOUNTER — DOCUMENTATION ONLY (OUTPATIENT)
Dept: REHABILITATION | Facility: HOSPITAL | Age: 53
End: 2023-03-24

## 2023-03-24 ENCOUNTER — CLINICAL SUPPORT (OUTPATIENT)
Dept: REHABILITATION | Facility: HOSPITAL | Age: 53
End: 2023-03-24
Payer: COMMERCIAL

## 2023-03-24 DIAGNOSIS — Z74.09 DECREASED FUNCTIONAL MOBILITY AND ENDURANCE: Primary | ICD-10-CM

## 2023-03-24 DIAGNOSIS — M62.81 PROXIMAL MUSCLE WEAKNESS: ICD-10-CM

## 2023-03-24 DIAGNOSIS — M25.662 DECREASED RANGE OF MOTION OF LEFT KNEE: ICD-10-CM

## 2023-03-24 PROCEDURE — 97112 NEUROMUSCULAR REEDUCATION: CPT | Mod: CQ

## 2023-03-24 PROCEDURE — 97530 THERAPEUTIC ACTIVITIES: CPT | Mod: CQ

## 2023-03-24 PROCEDURE — 97110 THERAPEUTIC EXERCISES: CPT | Mod: CQ

## 2023-03-24 NOTE — PROGRESS NOTES
PT/PTA met face to face to discuss pt's treatment plan and progress towards established goals. Pt will be seen by a physical therapist minimally every 6th visit or every 30 days.        Colt Lacey PTA

## 2023-03-24 NOTE — PROGRESS NOTES
[facility]   Physical Therapy Treatment Note     Name: Corie Carreno  Clinic Number: [id number]    Therapy Diagnosis:   No diagnosis found.    Physician: Callum Lynn MD    Visit Date: 3/24/2023    Physician Orders: PT Eval and Treat  Medical Diagnosis from Referral: Chronic pain of left knee, Iliotibial band syndrome of left side, Primary osteoarthritis of left knee  Evaluation Date: 3/13/2023  Authorization Period Expiration: 3/9/24  Plan of Care Expiration: 5/13/2023  Progress Note Due: 4/13/2023  Visit # / Visits authorized: 2/20  FOTO: 1/3 (last performed on 3/13/2023)     Precautions: Standard    Time In: 1045  Time Out: 1130  Total Billable Time: 40 minutes (Billing reflects 1 on 1 treatment time spent with patient)    SUBJECTIVE     Patient reports: she is feeling good today. More pain after last session possibly due to step ups which was done toward the end of last session. Stretches have been helping with decreasing her pain.    She was compliant with home exercise program.    Response to previous treatment: had more pain after last session which resolved 3-4 days post last session.     Functional change: improved knee flexion ROM and hamstring mobility     Pain: 1/10     Location: Left  knee    OBJECTIVE     Objective Measures updated at progress report only unless specified.       TREATMENT     Corie received the treatments listed below:     THERAPEUTIC EXERCISES to develop strength, endurance, ROM, flexibility, posture, and core stabilization for (13) minutes including:    Intervention Performed Today    Exercise bike  x 5 minutes    Nu step   Level 1 for 5 minutes    Stretches  X  X  X  x Hamstring (stool): 2 x 30 seconds  Bilateral   ITB (stool): 2 x 30 seconds  Bilateral   Piriformis (stool): 2 x 30 seconds  Bilateral    Adductor (seated): 2 x 30 seconds  Bilateral    LAQ  5# 3x10 B    Heel slides  x  x With strap: 10 x 10 seconds holds   Without strap: 10 x with  posterior pelvic tilt bilateral                          NEUROMUSCULAR RE-EDUCATION ACTIVITIES to improve Balance, Coordination, Kinesthetic, Sense, Proprioception, and Posture for (13) minutes.  The following were included:    Intervention Performed Today    Posterior pelvic tilt  x 3 minutes 5 seconds hold   March with posterior pelvic tilt   2x10 Bilateral     Clamshells  x 3 x 10 Bilateral     Tandem stance  x 3 x 30 seconds  Bilateral light touch as necessary     Single leg stance   2x30s Bilateral                          THERAPEUTIC ACTIVITIES to improve dynamic and functional  performance for (14) minutes including:    Intervention Performed Today    Bridge with abdominal brace  x 3 x 10    Step ups   6 inch 2x10 B    Straight leg raise with posterior pelvic tilt  x 2 x 10 bilateral                                   PATIENT EDUCATION AND HOME EXERCISES     Home Exercises Provided and Patient Education Provided     Education provided: (time included with therex) minutes  PURPOSE: Patient educated on the impairments noted above and the effects of physical therapy intervention to improve overall condition and QOL.   EXERCISE: Patient was educated on all the above exercise prior/during/after for proper posture, positioning, and execution for safe performance with home exercise program.   STRENGTH: Patient educated on the importance of improved core and extremity strength in order to improve alignment of the spine and extremities with static positions and dynamic movement.   GAIT & BALANCE: Patient educated on the importance of strong core and lower extremity musculature in order to improve both static and dynamic balance, improve gait mechanics, reduce fall risk and improve household and community mobility.     Written Home Exercises Provided: yes.  Exercises were reviewed and Corie was able to demonstrate them prior to the end of the session.  Corie demonstrated good  understanding of the education provided.  See EMR under Patient Instructions for exercises provided during therapy sessions.    ASSESSMENT     Pt tolerated session well today. Improved mobility noted with all stretches compared to previous session. Patient demonstrates symmetrical knee flexion following heel slides. Posterior pelvic tilt was able to be maintained with many exercises to improve core stability     Terrevelyn is progressing well towards her goals.   Pt prognosis is Good.     Pt will continue to benefit from skilled outpatient physical therapy to address the deficits listed in the problem list box on initial evaluation, provide pt/family education and to maximize pt's level of independence in the home and community environment.     Pt's spiritual, cultural and educational needs considered and pt agreeable to plan of care and goals.     Anticipated Barriers for therapy: co-morbidities, chronicity of condition, and adherence to treatment plan       Short Term Goals:  4 weeks Status  Date Met   PAIN: Pt will report worst pain of 5/10 in order to progress toward max functional ability and improve quality of life. [x] Progressing  [] Met  [] Not Met     FUNCTION: Patient will demonstrate improved function as indicated by a functional limitation score of less than or equal to 45 out of 100 on FOTO. [x] Progressing  [] Met  [] Not Met     MOBILITY: Patient will improve AROM to 50% of stated goals, listed in objective measures above, in order to progress towards independence with functional activities.  [x] Progressing  [] Met  [] Not Met     STRENGTH: Patient will improve strength to 50% of stated goals, listed in objective measures above, in order to progress towards independence with functional activities.  [x] Progressing  [] Met  [] Not Met     POSTURE: Patient will correct postural deviations in sitting and standing, to decrease pain and promote long term stability.  [x] Progressing  [] Met  [] Not Met     HEP: Patient will demonstrate independence  with HEP in order to progress toward functional independence. [x] Progressing  [] Met  [] Not Met        Long Term Goals:  8 weeks Status Date Met   PAIN: Pt will report worst pain of 2/10 in order to progress toward max functional ability and improve quality of life [x] Progressing  [] Met  [] Not Met     FUNCTION: Patient will demonstrate improved function as indicated by a functional limitation score of less than or equal to 39 out of 100 on FOTO. [x] Progressing  [] Met  [] Not Met     MOBILITY: Patient will improve AROM to stated goals, listed in objective measures above, in order to return to maximal functional potential and improve quality of life. [x] Progressing  [] Met  [] Not Met     STRENGTH: Patient will improve strength to stated goals, listed in objective measures above, in order to improve functional independence and quality of life. [x] Progressing  [] Met  [] Not Met     Patient will return to normal ADL's, IADL's, community involvement, recreational activities, and work-related activities with less than or equal to 2/10 pain and maximal function.  [x] Progressing  [] Met  [] Not Met         PLAN     Continue Plan of Care (POC) and progress per patient tolerance. See treatment section for details on planned progressions next session.      Colt Lacey, PTA

## 2023-03-31 ENCOUNTER — CLINICAL SUPPORT (OUTPATIENT)
Dept: REHABILITATION | Facility: HOSPITAL | Age: 53
End: 2023-03-31
Payer: COMMERCIAL

## 2023-03-31 DIAGNOSIS — M25.662 DECREASED RANGE OF MOTION OF LEFT KNEE: ICD-10-CM

## 2023-03-31 DIAGNOSIS — M62.81 PROXIMAL MUSCLE WEAKNESS: ICD-10-CM

## 2023-03-31 DIAGNOSIS — Z74.09 DECREASED FUNCTIONAL MOBILITY AND ENDURANCE: Primary | ICD-10-CM

## 2023-03-31 PROCEDURE — 97110 THERAPEUTIC EXERCISES: CPT

## 2023-03-31 PROCEDURE — 97530 THERAPEUTIC ACTIVITIES: CPT

## 2023-03-31 PROCEDURE — 97112 NEUROMUSCULAR REEDUCATION: CPT

## 2023-03-31 NOTE — PROGRESS NOTES
[facility]   Physical Therapy Treatment Note     Name: Corie Carreno  Clinic Number: [id number]    Therapy Diagnosis:   Encounter Diagnoses   Name Primary?    Decreased functional mobility and endurance Yes    Proximal muscle weakness     Decreased range of motion of left knee      Physician: Callum Lynn MD    Visit Date: 3/31/2023    Physician Orders: PT Eval and Treat  Medical Diagnosis from Referral: Chronic pain of left knee, Iliotibial band syndrome of left side, Primary osteoarthritis of left knee  Evaluation Date: 3/13/2023  Authorization Period Expiration: 3/9/24  Plan of Care Expiration: 5/13/2023  Progress Note Due: 4/13/2023  Visit # / Visits authorized: 3/20  FOTO: 1/3 (last performed on 3/13/2023)     Precautions: Standard    Time In: 1330  Time Out: 1429  Total Billable Time: 53 minutes (Billing reflects 1 on 1 treatment time spent with patient)    SUBJECTIVE     Patient reports: she is longer having lateral knee pain and states that medial knee pain has decreased to 3/10 max. Still has difficulty crossing the L ankle over the R knee. She typically feels great following sessions, with the exception of the time she had to do step ups     She was compliant with home exercise program.  Response to previous treatment: had more pain after last session which resolved 3-4 days post last session.   Functional change: improved knee flexion ROM and hamstring mobility     Pain: 1/10     Location: Left  knee    OBJECTIVE     Objective Measures updated at progress report only unless specified.       TREATMENT     Corie received the treatments listed below:       THERAPEUTIC EXERCISES to develop strength, endurance, ROM, flexibility, posture, and core stabilization for (18) minutes including:    Performed Today:     Nu-step: level 5 for 6 minutes   Stretches   Hamstring (stool): 30 seconds  Bilateral   ITB (stool): 30 seconds  Bilateral   Piriformis (stool): 30 seconds  Bilateral     Adductor (seated): 30 seconds  Bilateral   Straight leg raise flexion 3x10 B   Straight leg raise adduction 3x10 on L  Interventions DEFERRED today                  NEUROMUSCULAR RE-EDUCATION ACTIVITIES to improve Balance, Coordination, Kinesthetic, Sense, Proprioception, and Posture for (12) minutes.  The following were included:    Performed Today:     Posterior pelvic tilt 3 minutes x 5s holds   Tandem stance AIREX 2x30s B  Side stepping RED 3 laps     Interventions DEFERRED today     Single leg stance 2x30s B              THERAPEUTIC ACTIVITIES to improve dynamic and functional  performance for (23) minutes including:    Performed Today:     Bridge + abdominal brace 3x10   DL Knee extensions 25# 3x10   DL Hamstring curls 35# 3x10   Shuttle squats 7 bands 5 minutes   Calf raises 3x10  Interventions DEFERRED today                  Next Session:  Standing hamstring curls   Modified single leg stance + kettle pass         PATIENT EDUCATION AND HOME EXERCISES     Home Exercises Provided and Patient Education Provided     Education provided: (time included with therex) minutes  PURPOSE: Patient educated on the impairments noted above and the effects of physical therapy intervention to improve overall condition and QOL.   EXERCISE: Patient was educated on all the above exercise prior/during/after for proper posture, positioning, and execution for safe performance with home exercise program.   STRENGTH: Patient educated on the importance of improved core and extremity strength in order to improve alignment of the spine and extremities with static positions and dynamic movement.   GAIT & BALANCE: Patient educated on the importance of strong core and lower extremity musculature in order to improve both static and dynamic balance, improve gait mechanics, reduce fall risk and improve household and community mobility.     Written Home Exercises Provided: yes.  Exercises were reviewed and Corie was able to demonstrate them  prior to the end of the session.  Corie demonstrated good  understanding of the education provided. See EMR under Patient Instructions for exercises provided during therapy sessions.    ASSESSMENT     Pt tolerated session well today; significant progressions made with no adverse reactions reported by pt.   Response to Therapeutic Exercise: incorporated straight leg raise adduction to improve medial thigh strength; limited vertical displacement noted.   Response to Neuromuscular Re-education: progressed tandem stance to AIREX to further challenge stability; fingertip support required to maintain balance   Response to Therapeutic Activity: added shuttle squats to progress functional quadricep strength; no adverse symptoms reported throughout intervention     Corie is progressing well towards her goals.   Pt prognosis is Good.     Pt will continue to benefit from skilled outpatient physical therapy to address the deficits listed in the problem list box on initial evaluation, provide pt/family education and to maximize pt's level of independence in the home and community environment.     Pt's spiritual, cultural and educational needs considered and pt agreeable to plan of care and goals.     Anticipated Barriers for therapy: co-morbidities, chronicity of condition, and adherence to treatment plan       Short Term Goals:  4 weeks Status  Date Met   PAIN: Pt will report worst pain of 5/10 in order to progress toward max functional ability and improve quality of life. [x] Progressing  [] Met  [] Not Met     FUNCTION: Patient will demonstrate improved function as indicated by a functional limitation score of less than or equal to 45 out of 100 on FOTO. [x] Progressing  [] Met  [] Not Met     MOBILITY: Patient will improve AROM to 50% of stated goals, listed in objective measures above, in order to progress towards independence with functional activities.  [x] Progressing  [] Met  [] Not Met     STRENGTH: Patient will  improve strength to 50% of stated goals, listed in objective measures above, in order to progress towards independence with functional activities.  [x] Progressing  [] Met  [] Not Met     POSTURE: Patient will correct postural deviations in sitting and standing, to decrease pain and promote long term stability.  [x] Progressing  [] Met  [] Not Met     HEP: Patient will demonstrate independence with HEP in order to progress toward functional independence. [x] Progressing  [] Met  [] Not Met        Long Term Goals:  8 weeks Status Date Met   PAIN: Pt will report worst pain of 2/10 in order to progress toward max functional ability and improve quality of life [x] Progressing  [] Met  [] Not Met     FUNCTION: Patient will demonstrate improved function as indicated by a functional limitation score of less than or equal to 39 out of 100 on FOTO. [x] Progressing  [] Met  [] Not Met     MOBILITY: Patient will improve AROM to stated goals, listed in objective measures above, in order to return to maximal functional potential and improve quality of life. [x] Progressing  [] Met  [] Not Met     STRENGTH: Patient will improve strength to stated goals, listed in objective measures above, in order to improve functional independence and quality of life. [x] Progressing  [] Met  [] Not Met     Patient will return to normal ADL's, IADL's, community involvement, recreational activities, and work-related activities with less than or equal to 2/10 pain and maximal function.  [x] Progressing  [] Met  [] Not Met         PLAN     Continue Plan of Care (POC) and progress per patient tolerance. See treatment section for details on planned progressions next session.      Colt Lacey, PTA

## 2023-04-05 ENCOUNTER — CLINICAL SUPPORT (OUTPATIENT)
Dept: REHABILITATION | Facility: HOSPITAL | Age: 53
End: 2023-04-05
Payer: COMMERCIAL

## 2023-04-05 DIAGNOSIS — M25.662 DECREASED RANGE OF MOTION OF LEFT KNEE: ICD-10-CM

## 2023-04-05 DIAGNOSIS — M62.81 PROXIMAL MUSCLE WEAKNESS: ICD-10-CM

## 2023-04-05 DIAGNOSIS — Z74.09 DECREASED FUNCTIONAL MOBILITY AND ENDURANCE: Primary | ICD-10-CM

## 2023-04-05 PROCEDURE — 97112 NEUROMUSCULAR REEDUCATION: CPT

## 2023-04-05 PROCEDURE — 97110 THERAPEUTIC EXERCISES: CPT

## 2023-04-05 PROCEDURE — 97530 THERAPEUTIC ACTIVITIES: CPT

## 2023-04-05 NOTE — PROGRESS NOTES
[facility]   Physical Therapy Treatment Note     Name: Corie Carreno  Clinic Number: [id number]    Therapy Diagnosis:   Encounter Diagnoses   Name Primary?    Decreased functional mobility and endurance Yes    Proximal muscle weakness     Decreased range of motion of left knee        Physician: Callum Lynn MD    Visit Date: 4/5/2023    Physician Orders: PT Eval and Treat  Medical Diagnosis from Referral: Chronic pain of left knee, Iliotibial band syndrome of left side, Primary osteoarthritis of left knee  Evaluation Date: 3/13/2023  Authorization Period Expiration: 3/9/24  Plan of Care Expiration: 5/13/2023  Progress Note Due: 4/13/2023  Visit # / Visits authorized: 4/20  FOTO: 1/3 (last performed on 3/13/2023)     Precautions: Standard    Time In: 1000  Time Out: 1058  Total Billable Time: 53 minutes (Billing reflects 1 on 1 treatment time spent with patient)    SUBJECTIVE     Patient reports: she is longer having lateral knee pain and states that medial knee pain has decreased to 3/10 max. Still has difficulty crossing the L ankle over the R knee. She typically feels great following sessions, with the exception of the time she had to do step ups     She was compliant with home exercise program.  Response to previous treatment: had more pain after last session which resolved 3-4 days post last session.   Functional change: improved knee flexion ROM and hamstring mobility     Pain: 1/10     Location: Left  knee    OBJECTIVE     Objective Measures updated at progress report only unless specified.       TREATMENT     Corie received the treatments listed below:       THERAPEUTIC EXERCISES to develop strength, endurance, ROM, flexibility, posture, and core stabilization for (18) minutes including:    Performed Today:     Recumbent Bike: Level 5 for 8 minutes   Stretches   Hamstring (seated+stool): 2x30 seconds  B   ITB (seated+stool): 2x30 seconds  B   Adductor (seated): 2x30 seconds  B    Straight leg raise flexion 3x10 B   Straight leg raise adduction 3x10 on L   Straight leg raise abduction 3x10 on L    Interventions DEFERRED today     Nu-step: level 5 for 6 minutes              NEUROMUSCULAR RE-EDUCATION ACTIVITIES to improve Balance, Coordination, Kinesthetic, Sense, Proprioception, and Posture for (10) minutes.  The following were included:    Performed Today:     Tandem stance AIREX 2x30s B  Side stepping RED 3 laps         Interventions DEFERRED today     Posterior pelvic tilt 3 minutes x 5s holds            THERAPEUTIC ACTIVITIES to improve dynamic and functional  performance for (25) minutes including:    Performed Today:     Bridge + abduction isometric GREEN 3x10   Calf raises 3x10   DL Knee extensions 25# 3x10   DL Hamstring curls 35# 3x10   Shuttle   DL press: 7 bands 5 minutes   SL press: 4 bands 3x10 B   Standing marches 3x10 B    Interventions DEFERRED today                  Next Session:  Modified single leg stance kettle pass 5# 2x1 minute   Standing TKE   Steamboats         PATIENT EDUCATION AND HOME EXERCISES     Home Exercises Provided and Patient Education Provided     Education provided: (time included with therex) minutes  PURPOSE: Patient educated on the impairments noted above and the effects of physical therapy intervention to improve overall condition and QOL.   EXERCISE: Patient was educated on all the above exercise prior/during/after for proper posture, positioning, and execution for safe performance with home exercise program.   STRENGTH: Patient educated on the importance of improved core and extremity strength in order to improve alignment of the spine and extremities with static positions and dynamic movement.   GAIT & BALANCE: Patient educated on the importance of strong core and lower extremity musculature in order to improve both static and dynamic balance, improve gait mechanics, reduce fall risk and improve household and community mobility.     Written Home  Exercises Provided: yes.  Exercises were reviewed and Corie was able to demonstrate them prior to the end of the session.  Corie demonstrated good  understanding of the education provided. See EMR under Patient Instructions for exercises provided during therapy sessions.    ASSESSMENT     Pt tolerated session well today; significant progressions made with no adverse reactions reported by pt.   Response to Therapeutic Exercise: incorporated straight leg raise abduction to improve posterolateral hip strength   Response to Neuromuscular Re-education: improved endurance noted with side stepping with decrease breaks required   Response to Therapeutic Activity: added SL shuttle squats to progress functional quadricep strength; no adverse symptoms reported throughout intervention. Mild discomfort noted at medial knee with hamstring curls     Corie is progressing well towards her goals.   Pt prognosis is Good.     Pt will continue to benefit from skilled outpatient physical therapy to address the deficits listed in the problem list box on initial evaluation, provide pt/family education and to maximize pt's level of independence in the home and community environment.     Pt's spiritual, cultural and educational needs considered and pt agreeable to plan of care and goals.     Anticipated Barriers for therapy: co-morbidities, chronicity of condition, and adherence to treatment plan       Short Term Goals:  4 weeks Status  Date Met   PAIN: Pt will report worst pain of 5/10 in order to progress toward max functional ability and improve quality of life. [x] Progressing  [] Met  [] Not Met     FUNCTION: Patient will demonstrate improved function as indicated by a functional limitation score of less than or equal to 45 out of 100 on FOTO. [x] Progressing  [] Met  [] Not Met     MOBILITY: Patient will improve AROM to 50% of stated goals, listed in objective measures above, in order to progress towards independence with functional  activities.  [x] Progressing  [] Met  [] Not Met     STRENGTH: Patient will improve strength to 50% of stated goals, listed in objective measures above, in order to progress towards independence with functional activities.  [x] Progressing  [] Met  [] Not Met     POSTURE: Patient will correct postural deviations in sitting and standing, to decrease pain and promote long term stability.  [x] Progressing  [] Met  [] Not Met     HEP: Patient will demonstrate independence with HEP in order to progress toward functional independence. [x] Progressing  [] Met  [] Not Met        Long Term Goals:  8 weeks Status Date Met   PAIN: Pt will report worst pain of 2/10 in order to progress toward max functional ability and improve quality of life [x] Progressing  [] Met  [] Not Met     FUNCTION: Patient will demonstrate improved function as indicated by a functional limitation score of less than or equal to 39 out of 100 on FOTO. [x] Progressing  [] Met  [] Not Met     MOBILITY: Patient will improve AROM to stated goals, listed in objective measures above, in order to return to maximal functional potential and improve quality of life. [x] Progressing  [] Met  [] Not Met     STRENGTH: Patient will improve strength to stated goals, listed in objective measures above, in order to improve functional independence and quality of life. [x] Progressing  [] Met  [] Not Met     Patient will return to normal ADL's, IADL's, community involvement, recreational activities, and work-related activities with less than or equal to 2/10 pain and maximal function.  [x] Progressing  [] Met  [] Not Met         PLAN     Continue Plan of Care (POC) and progress per patient tolerance. See treatment section for details on planned progressions next session.      Colt Lacey, PTA

## 2023-04-10 ENCOUNTER — CLINICAL SUPPORT (OUTPATIENT)
Dept: REHABILITATION | Facility: HOSPITAL | Age: 53
End: 2023-04-10
Payer: COMMERCIAL

## 2023-04-10 DIAGNOSIS — Z74.09 DECREASED FUNCTIONAL MOBILITY AND ENDURANCE: Primary | ICD-10-CM

## 2023-04-10 DIAGNOSIS — M62.81 PROXIMAL MUSCLE WEAKNESS: ICD-10-CM

## 2023-04-10 DIAGNOSIS — M25.662 DECREASED RANGE OF MOTION OF LEFT KNEE: ICD-10-CM

## 2023-04-10 PROCEDURE — 97110 THERAPEUTIC EXERCISES: CPT

## 2023-04-10 PROCEDURE — 97530 THERAPEUTIC ACTIVITIES: CPT

## 2023-04-10 PROCEDURE — 97112 NEUROMUSCULAR REEDUCATION: CPT

## 2023-04-10 NOTE — PROGRESS NOTES
[facility]   Physical Therapy Treatment Note     Name: Corie Carreno  Clinic Number: [id number]    Therapy Diagnosis:   Encounter Diagnoses   Name Primary?    Decreased functional mobility and endurance Yes    Proximal muscle weakness     Decreased range of motion of left knee      Physician: Callum Lynn MD    Visit Date: 4/10/2023    Physician Orders: PT Eval and Treat  Medical Diagnosis from Referral: Chronic pain of left knee, Iliotibial band syndrome of left side, Primary osteoarthritis of left knee  Evaluation Date: 3/13/2023  Authorization Period Expiration: 3/9/24  Plan of Care Expiration: 5/13/2023  Progress Note Due: 4/13/2023  Visit # / Visits authorized: 5/20  FOTO: 1/3 (last performed on 3/13/2023)     Precautions: Standard    Time In: 1600  Time Out: 1658  Total Billable Time: 54 minutes (Billing reflects 1 on 1 treatment time spent with patient)    SUBJECTIVE     Patient reports: she is feeling okay with no significant changes noted in medial knee pain      She was compliant with home exercise program.  Response to previous treatment: had more pain after last session which resolved 3-4 days post last session.   Functional change: improved knee flexion ROM and hamstring mobility     Pain: 1/10     Location: Left  knee    OBJECTIVE     Objective Measures updated at progress report only unless specified.       TREATMENT     Corie received the treatments listed below:       THERAPEUTIC EXERCISES to develop strength, endurance, ROM, flexibility, posture, and core stabilization for (11) minutes including:    Performed Today:     Recumbent Bike: Level 5 for 8 minutes   Stretches   Adductor (seated): 2x30 seconds  B      Interventions DEFERRED today     Hamstring stretch (seated+Stool)   ITB stretch (seated+Stool)            NEUROMUSCULAR RE-EDUCATION ACTIVITIES to improve Balance, Coordination, Kinesthetic, Sense, Proprioception, and Posture for (23) minutes.  The following  were included:    Performed Today:     Tandem stance AIREX 2x30s B  Modified single leg stance kettle pass 5# 2x30s B   Standing TKE MAROON grey-cook band 3x10    Steamboats YELLOW 2x8 B (B upper extremity support)   Tabletop hold (ball) + alternating arms 3x10 B     Interventions DEFERRED today     Posterior pelvic tilt 3 minutes x 5s holds            THERAPEUTIC ACTIVITIES to improve dynamic and functional  performance for (20) minutes including:    Performed Today:     Calf raises (stairs) 3x10   Knee extensions   DL: 25# 1x10   Eccentric 15# 2x10 B   Hamstring curls   DL: 35# 3x10      Interventions DEFERRED today     Bridge + abduction isometric GREEN 3x10    5s holds??  Side stepping RED 3 laps   Shuttle   DL press: 7 bands 5 minutes   SL press: 4 bands 3x10 B          Next Session:  SL hamstring curls   Tabletop arm arc   Modified SL bridge isometric with march   Tandem paloff press or paloff walk outs   Quadruped progression- arm raise, leg extension, Bird dogs         PATIENT EDUCATION AND HOME EXERCISES     Home Exercises Provided and Patient Education Provided     Education provided: (time included with therex) minutes  PURPOSE: Patient educated on the impairments noted above and the effects of physical therapy intervention to improve overall condition and QOL.   EXERCISE: Patient was educated on all the above exercise prior/during/after for proper posture, positioning, and execution for safe performance with home exercise program.   STRENGTH: Patient educated on the importance of improved core and extremity strength in order to improve alignment of the spine and extremities with static positions and dynamic movement.   GAIT & BALANCE: Patient educated on the importance of strong core and lower extremity musculature in order to improve both static and dynamic balance, improve gait mechanics, reduce fall risk and improve household and community mobility.     Written Home Exercises Provided: yes.  Exercises  were reviewed and Corie was able to demonstrate them prior to the end of the session.  Corie demonstrated good  understanding of the education provided. See EMR under Patient Instructions for exercises provided during therapy sessions.    ASSESSMENT     Pt tolerated session well today; significant progressions made with no adverse reactions reported by pt.   Response to Therapeutic Exercise: discontinued hamstring and ITB stretches and instructed pt to continue as part of HEP   Response to Neuromuscular Re-education: added standing TKE to improve quadricep facilitation with activity.  Response to Therapeutic Activity: added eccentric knee extensions to improve functional quadricep strength    Corie is progressing well towards her goals.   Pt prognosis is Good.     Pt will continue to benefit from skilled outpatient physical therapy to address the deficits listed in the problem list box on initial evaluation, provide pt/family education and to maximize pt's level of independence in the home and community environment.     Pt's spiritual, cultural and educational needs considered and pt agreeable to plan of care and goals.     Anticipated Barriers for therapy: co-morbidities, chronicity of condition, and adherence to treatment plan       Short Term Goals:  4 weeks Status  Date Met   PAIN: Pt will report worst pain of 5/10 in order to progress toward max functional ability and improve quality of life. [x] Progressing  [] Met  [] Not Met     FUNCTION: Patient will demonstrate improved function as indicated by a functional limitation score of less than or equal to 45 out of 100 on FOTO. [x] Progressing  [] Met  [] Not Met     MOBILITY: Patient will improve AROM to 50% of stated goals, listed in objective measures above, in order to progress towards independence with functional activities.  [x] Progressing  [] Met  [] Not Met     STRENGTH: Patient will improve strength to 50% of stated goals, listed in objective measures  above, in order to progress towards independence with functional activities.  [x] Progressing  [] Met  [] Not Met     POSTURE: Patient will correct postural deviations in sitting and standing, to decrease pain and promote long term stability.  [x] Progressing  [] Met  [] Not Met     HEP: Patient will demonstrate independence with HEP in order to progress toward functional independence. [x] Progressing  [] Met  [] Not Met        Long Term Goals:  8 weeks Status Date Met   PAIN: Pt will report worst pain of 2/10 in order to progress toward max functional ability and improve quality of life [x] Progressing  [] Met  [] Not Met     FUNCTION: Patient will demonstrate improved function as indicated by a functional limitation score of less than or equal to 39 out of 100 on FOTO. [x] Progressing  [] Met  [] Not Met     MOBILITY: Patient will improve AROM to stated goals, listed in objective measures above, in order to return to maximal functional potential and improve quality of life. [x] Progressing  [] Met  [] Not Met     STRENGTH: Patient will improve strength to stated goals, listed in objective measures above, in order to improve functional independence and quality of life. [x] Progressing  [] Met  [] Not Met     Patient will return to normal ADL's, IADL's, community involvement, recreational activities, and work-related activities with less than or equal to 2/10 pain and maximal function.  [x] Progressing  [] Met  [] Not Met         PLAN     Continue Plan of Care (POC) and progress per patient tolerance. See treatment section for details on planned progressions next session.      Colt Lacey, PTA

## 2023-04-12 ENCOUNTER — CLINICAL SUPPORT (OUTPATIENT)
Dept: REHABILITATION | Facility: HOSPITAL | Age: 53
End: 2023-04-12
Payer: COMMERCIAL

## 2023-04-12 DIAGNOSIS — Z74.09 DECREASED FUNCTIONAL MOBILITY AND ENDURANCE: Primary | ICD-10-CM

## 2023-04-12 DIAGNOSIS — M25.662 DECREASED RANGE OF MOTION OF LEFT KNEE: ICD-10-CM

## 2023-04-12 DIAGNOSIS — M62.81 PROXIMAL MUSCLE WEAKNESS: ICD-10-CM

## 2023-04-12 PROCEDURE — 97112 NEUROMUSCULAR REEDUCATION: CPT

## 2023-04-12 PROCEDURE — 97110 THERAPEUTIC EXERCISES: CPT

## 2023-04-12 PROCEDURE — 97530 THERAPEUTIC ACTIVITIES: CPT

## 2023-04-12 NOTE — PROGRESS NOTES
[facility]   Physical Therapy Treatment Note     Name: Corie Carreno  Clinic Number: [id number]    Therapy Diagnosis:   Encounter Diagnoses   Name Primary?    Decreased functional mobility and endurance Yes    Proximal muscle weakness     Decreased range of motion of left knee      Physician: Callum Lynn MD    Visit Date: 4/12/2023    Physician Orders: PT Eval and Treat  Medical Diagnosis from Referral: Chronic pain of left knee, Iliotibial band syndrome of left side, Primary osteoarthritis of left knee  Evaluation Date: 3/13/2023  Authorization Period Expiration: 3/9/24  Plan of Care Expiration: 5/13/2023  Progress Note Due: 4/13/2023  Visit # / Visits authorized: 6/20  FOTO: 1/3 (last performed on 3/13/2023)     Precautions: Standard    Time In: 1000  Time Out: 1058  Total Billable Time: 53 minutes (Billing reflects 1 on 1 treatment time spent with patient)    SUBJECTIVE     Patient reports: Patient reports she is feeling good today. Tolerated increased activity on Monday.     She was compliant with home exercise program.  Response to previous treatment: had more pain after last session which resolved 3-4 days post last session.   Functional change: improved knee flexion ROM and hamstring mobility     Pain: 0/10     Location: Left  knee    OBJECTIVE     Objective Measures updated at progress report only unless specified.       TREATMENT     Corie received the treatments listed below:       THERAPEUTIC EXERCISES to develop strength, endurance, ROM, flexibility, posture, and core stabilization for (8) minutes including:    Performed Today:     Recumbent Bike: Level 5 for 5 minutes   Stretches   Adductor (seated): 2x30 seconds  B      Interventions DEFERRED today     Hamstring stretch (seated+Stool)   ITB stretch (seated+Stool)            NEUROMUSCULAR RE-EDUCATION ACTIVITIES to improve Balance, Coordination, Kinesthetic, Sense, Proprioception, and Posture for (35) minutes.  The  following were included:    Performed Today:     Posterior pelvic tilt 2 minutes x 10s holds   Tabletop arm arc (ball): 3x10  Modified SL bridge isometric + march 3x10 B   Quadruped   Arm raises 10x B   Leg extensions 10x B   Bird dog 2x10 B   Prone hip extensions (knee flexed) 3x10 B   Clamshells 2x10 B with 5s holds       Interventions DEFERRED today     Standing TKE BHAVESH grey-cook band 3x10  Tandem stance AIREX 2x30s B  Modified single leg stance kettle pass 5# 2x30s B           THERAPEUTIC ACTIVITIES to improve dynamic and functional  performance for (10) minutes including:    Performed Today:     Calf raises (stairs) 3x10   Knee extensions   Eccentric 15# 3x10 B      Interventions DEFERRED today     Bridge + abduction isometric GREEN 3x10    5s holds??  Side stepping RED 3 laps   Shuttle   DL press: 7 bands 5 minutes   SL press: 4 bands 3x10 B   Hamstring curls   DL: 35# 3x10  SL: 15# 3x10 B       Next Session:  Tandem paloff press or paloff walk outs           PATIENT EDUCATION AND HOME EXERCISES     Home Exercises Provided and Patient Education Provided     Education provided: (time included with therex) minutes  PURPOSE: Patient educated on the impairments noted above and the effects of physical therapy intervention to improve overall condition and QOL.   EXERCISE: Patient was educated on all the above exercise prior/during/after for proper posture, positioning, and execution for safe performance with home exercise program.   STRENGTH: Patient educated on the importance of improved core and extremity strength in order to improve alignment of the spine and extremities with static positions and dynamic movement.   GAIT & BALANCE: Patient educated on the importance of strong core and lower extremity musculature in order to improve both static and dynamic balance, improve gait mechanics, reduce fall risk and improve household and community mobility.     Written Home Exercises Provided: yes.  Exercises were  reviewed and Corie was able to demonstrate them prior to the end of the session.  Corie demonstrated good  understanding of the education provided. See EMR under Patient Instructions for exercises provided during therapy sessions.    ASSESSMENT     Pt tolerated session well today.  Increased focus on core strength and stability today; no adverse reactions reported by pt   Response to Therapeutic Exercise: continue adductor stretches due to medial knee pain and pes anserine anserine tenderness to palpation   Response to Neuromuscular Re-education: added tabletop arm arcs and quadruped series to improve core strength and stability. Significant core fatigue noted with each. No cueing required to maintain form following initial cueing   Response to Therapeutic Activity: eccentric knee extensions tolerated well with decreased discomfort noted     Corie is progressing well towards her goals.   Pt prognosis is Good.     Pt will continue to benefit from skilled outpatient physical therapy to address the deficits listed in the problem list box on initial evaluation, provide pt/family education and to maximize pt's level of independence in the home and community environment.     Pt's spiritual, cultural and educational needs considered and pt agreeable to plan of care and goals.     Anticipated Barriers for therapy: co-morbidities, chronicity of condition, and adherence to treatment plan       Short Term Goals:  4 weeks Status  Date Met   PAIN: Pt will report worst pain of 5/10 in order to progress toward max functional ability and improve quality of life. [x] Progressing  [] Met  [] Not Met     FUNCTION: Patient will demonstrate improved function as indicated by a functional limitation score of less than or equal to 45 out of 100 on FOTO. [x] Progressing  [] Met  [] Not Met     MOBILITY: Patient will improve AROM to 50% of stated goals, listed in objective measures above, in order to progress towards independence with  functional activities.  [x] Progressing  [] Met  [] Not Met     STRENGTH: Patient will improve strength to 50% of stated goals, listed in objective measures above, in order to progress towards independence with functional activities.  [x] Progressing  [] Met  [] Not Met     POSTURE: Patient will correct postural deviations in sitting and standing, to decrease pain and promote long term stability.  [x] Progressing  [] Met  [] Not Met     HEP: Patient will demonstrate independence with HEP in order to progress toward functional independence. [x] Progressing  [] Met  [] Not Met        Long Term Goals:  8 weeks Status Date Met   PAIN: Pt will report worst pain of 2/10 in order to progress toward max functional ability and improve quality of life [x] Progressing  [] Met  [] Not Met     FUNCTION: Patient will demonstrate improved function as indicated by a functional limitation score of less than or equal to 39 out of 100 on FOTO. [x] Progressing  [] Met  [] Not Met     MOBILITY: Patient will improve AROM to stated goals, listed in objective measures above, in order to return to maximal functional potential and improve quality of life. [x] Progressing  [] Met  [] Not Met     STRENGTH: Patient will improve strength to stated goals, listed in objective measures above, in order to improve functional independence and quality of life. [x] Progressing  [] Met  [] Not Met     Patient will return to normal ADL's, IADL's, community involvement, recreational activities, and work-related activities with less than or equal to 2/10 pain and maximal function.  [x] Progressing  [] Met  [] Not Met         PLAN     Continue Plan of Care (POC) and progress per patient tolerance. See treatment section for details on planned progressions next session.      Colt Lacey, PTA

## 2023-04-20 ENCOUNTER — OFFICE VISIT (OUTPATIENT)
Dept: ORTHOPEDICS | Facility: CLINIC | Age: 53
End: 2023-04-20
Payer: COMMERCIAL

## 2023-04-20 VITALS — WEIGHT: 293 LBS | BODY MASS INDEX: 44.41 KG/M2 | HEIGHT: 68 IN

## 2023-04-20 DIAGNOSIS — M25.562 CHRONIC PAIN OF LEFT KNEE: Primary | ICD-10-CM

## 2023-04-20 DIAGNOSIS — M22.2X2 PATELLOFEMORAL PAIN SYNDROME OF LEFT KNEE: ICD-10-CM

## 2023-04-20 DIAGNOSIS — M17.12 PRIMARY OSTEOARTHRITIS OF LEFT KNEE: ICD-10-CM

## 2023-04-20 DIAGNOSIS — G89.29 CHRONIC PAIN OF LEFT KNEE: Primary | ICD-10-CM

## 2023-04-20 PROCEDURE — 4010F PR ACE/ARB THEARPY RXD/TAKEN: ICD-10-PCS | Mod: CPTII,S$GLB,, | Performed by: ORTHOPAEDIC SURGERY

## 2023-04-20 PROCEDURE — 99999 PR PBB SHADOW E&M-EST. PATIENT-LVL III: CPT | Mod: PBBFAC,,, | Performed by: ORTHOPAEDIC SURGERY

## 2023-04-20 PROCEDURE — 99999 PR PBB SHADOW E&M-EST. PATIENT-LVL III: ICD-10-PCS | Mod: PBBFAC,,, | Performed by: ORTHOPAEDIC SURGERY

## 2023-04-20 PROCEDURE — 4010F ACE/ARB THERAPY RXD/TAKEN: CPT | Mod: CPTII,S$GLB,, | Performed by: ORTHOPAEDIC SURGERY

## 2023-04-20 PROCEDURE — 99213 PR OFFICE/OUTPT VISIT, EST, LEVL III, 20-29 MIN: ICD-10-PCS | Mod: S$GLB,,, | Performed by: ORTHOPAEDIC SURGERY

## 2023-04-20 PROCEDURE — 99213 OFFICE O/P EST LOW 20 MIN: CPT | Mod: S$GLB,,, | Performed by: ORTHOPAEDIC SURGERY

## 2023-04-20 PROCEDURE — 1159F MED LIST DOCD IN RCRD: CPT | Mod: CPTII,S$GLB,, | Performed by: ORTHOPAEDIC SURGERY

## 2023-04-20 PROCEDURE — 3008F PR BODY MASS INDEX (BMI) DOCUMENTED: ICD-10-PCS | Mod: CPTII,S$GLB,, | Performed by: ORTHOPAEDIC SURGERY

## 2023-04-20 PROCEDURE — 1159F PR MEDICATION LIST DOCUMENTED IN MEDICAL RECORD: ICD-10-PCS | Mod: CPTII,S$GLB,, | Performed by: ORTHOPAEDIC SURGERY

## 2023-04-20 PROCEDURE — 3008F BODY MASS INDEX DOCD: CPT | Mod: CPTII,S$GLB,, | Performed by: ORTHOPAEDIC SURGERY

## 2023-04-20 NOTE — PROGRESS NOTES
Patient ID: Corie Carreno  YOB: 1970  MRN: [id number]    Chief Complaint: Pain of the Left Knee    Referred By: Referred by coworker     History of Present Illness: Corie Carreno is a 52 y.o. female    with a chief complaint of Pain of the Left Knee    The patient presents today for follow up evaluation of her left knee. She reports 90% improvement with the injection at the last visit and physical therapy. She has been working on strengthening with Yanni at [facility]. She reports she is scheduled to end next week.     Recall from initial visit: Patient presents to clinic with 2/10 pain in the left knee. Experiences stiffness and difficulty with flexion. Also has clicking and popping with ambulation. Reports an aching pain on the medial side of the knee. She has been treated previously MARISA Flores, Dr. Bunch's PA at [facility] in 2022. Treatment included PT at [facility] with slight improvement. Pain currently takes tylenol for pain.     HPI    Past Medical History:   Past Medical History:   Diagnosis Date    Arthritis     Hypertension     Spondylolisthesis      Past Surgical History:   Procedure Laterality Date    CHOLECYSTECTOMY      HYSTERECTOMY      TONSILLECTOMY       Family History   Problem Relation Age of Onset    Diabetes Mother     Cancer Mother      Social History     Socioeconomic History    Marital status:    Tobacco Use    Smoking status: Never    Smokeless tobacco: Never       Review of patient's allergies indicates:   Allergen Reactions    Doxycycline Other (See Comments)     Other reaction(s): Headache      Adhesive Blisters, Itching and Rash    Clindamycin Rash    Sulfa (sulfonamide antibiotics) Hives and Rash     ROS    Physical Exam:   Body mass index is 46.68 kg/m².  There were no vitals filed for this visit.   GENERAL: Well appearing, appropriate for stated age, no acute distress.  CARDIOVASCULAR: Pulses regular by peripheral palpation.  PULMONARY: Respirations are  even and non-labored.  NEURO: Awake, alert, and oriented x 3.  PSYCH: Mood & affect are appropriate.  HEENT: Head is normocephalic and atraumatic.              Left Knee Exam     Inspection   Scars: present  Effusion: absent    Tenderness   The patient tender to palpation of the medial joint line.    Range of Motion   Extension:  0   Flexion:  110     Tests   Meniscus   Ekta:  Medial - positive   Stability   Lachman: normal (-1 to 2mm)   MCL - Valgus: normal (0 to 2mm)  LCL - Varus: normal (0 to 2mm)    Other   Sensation: normal    Comments:  Intact EHL, FHL, gastrocsoleus, and tibialis anterior. Sensation intact to light touch in superficial peroneal, deep peroneal, tibial, sural, and saphenous nerve distributions. Foot warm and well perfused with capillary refill of less than 2 seconds and palpable pedal pulses.  Left Hip Exam     Tests   Darcy: negative          Muscle Strength   Left Lower Extremity   Hip Abduction: 5/5 (pain)   Quadriceps:  5/5   Hamstrin/5     Vascular Exam       Left Pulses  Dorsalis Pedis:      2+  Posterior Tibial:      2+          Imaging:    X-ray Knee Ortho Left with Flexion  Narrative: EXAMINATION:  XR KNEE ORTHO LEFT WITH FLEXION    CLINICAL HISTORY:  Pain in left knee    TECHNIQUE:  AP standing views of both knees, AP flexion views of both knees, lateral view of the left knee and Merchant views of both knees    COMPARISON:  None    FINDINGS:  There is mild joint space narrowing the medial compartment of the left knee and more mild-to-moderate joint space narrowing and osteophyte formation seen involving medial compartment of the right knee.  Minimal degenerative change involving the left patellofemoral compartment.  A small to moderate-sized suprapatellar joint effusion is seen on the left.  No acute fracture or dislocation.  Impression: 1.  As above    Electronically signed by: Ruiz Arizmendi DO  Date:    03/10/2023  Time:    09:20  X-Ray Femur 2 View Bilateral  Narrative:  EXAMINATION:  XR FEMUR 2 VIEW BILATERAL    CLINICAL HISTORY:  Pain in unspecified thigh    TECHNIQUE:  Two views of either femur, four views total    COMPARISON:  None    FINDINGS:  Both femurs are intact.  No obvious lytic or sclerotic lesions are identified.  No obvious soft tissue swelling noted.  Impression: As above    Electronically signed by: Ruiz Arizmendi DO  Date:    03/10/2023  Time:    08:20      Relevant imaging results reviewed and interpreted by me, discussed with the patient and / or family today.     Other Tests:         Patient Instructions   Assessment:  Corie Carreno is a  52 y.o. female    with a chief complaint of Pain of the Left Knee    Left knee early OA  PFPS of Left knee     Encounter Diagnoses   Name Primary?    Chronic pain of left knee Yes    Primary osteoarthritis of left knee     Patellofemoral pain syndrome of left knee       Plan:  Continue PT at [facility]   Home exercise program when discharged    Although there is not a cure for arthritis, there are effective ways to improve symptoms.     Exercise & Activity:  I recommend low impact activities such as elliptical and bicycle   Walking is great for arthritis: https://www.Cardize.com/3-reasons-walking-with-knee-arthritis/  If walking long distances, I recommend good quality well-cushioned shoes. Varsity sports can help you find the right ones: https://www.varsityHiveLiveing.com/  Aquatic and pool therapy is often helpful because it lessens the impact on the joint, strengthens the leg and thigh muscles, and helps to control swelling.   Knee motion is important to the health of the knee.     Knee Braces:  A compression knee sleeve can help limit swelling and provide proprioceptive feedback.     Prescriptions & Medications:  I do recommend formal physical therapy or at minimum a home exercise program.   Over the counter analgesic (pain-relieving) medications can help. Examples are Tylenol, Ibuprofen, and Aleve. Check with your  primary care physician to make sure you don't have contra-indications to taking those medicines.  Some over the counter supplement solutions such as glucosamine and chondroitin may help with symptoms, although the evidence is mixed.    Healthy Lifestyle:  Excess body weight can have a negative impact on joint health and on pain. I recommend healthy lifestyle choices including nutrition and exercise that help reach and maintain an ideal body weight. Tips for Exercise: https://www.[employer].SocialPandas/13-exercise-tips-for-a-healthier-you/  Some diets cause increased inflammation. I recommend a balanced wholesome diet including some foods such as olives that are shown to decrease inflammation. More diet information available here: https://www.[employer].SocialPandas/8-best-foods-for-knee-arthritis/     Follow-up: PRN or sooner if there are any problems between now and then.    Leave Review:   Google: Leave Google Review  Healthgrades: Leave Healthgrades Review    After Hours Number: [phone]        Provider Note/Medical Decision Making:       I discussed worrisome and red flag signs and symptoms with the patient. The patient expressed understanding and agreed to alert me immediately or to go to the emergency room if they experience any of these.   Treatment plan was developed with input from the patient/family, and they expressed understanding and agreement with the plan. All questions were answered today.          Callum Lynn MD  Orthopaedic Surgery & Sports Medicine       Disclaimer: This note was prepared using a voice recognition system and is likely to have sound alike errors within the text.     I, Minal Laguerre, acted as a scribe for Callum Lynn MD for the duration of this office visit.

## 2023-04-20 NOTE — PATIENT INSTRUCTIONS
Assessment:  Corie Carreno is a  52 y.o. female    with a chief complaint of Pain of the Left Knee    Left knee early OA  PFPS of Left knee     Encounter Diagnoses   Name Primary?    Chronic pain of left knee Yes    Primary osteoarthritis of left knee     Patellofemoral pain syndrome of left knee       Plan:  Continue PT at [facility]   Home exercise program when discharged    Although there is not a cure for arthritis, there are effective ways to improve symptoms.     Exercise & Activity:  I recommend low impact activities such as elliptical and bicycle   Walking is great for arthritis: https://www.Travark.Zumbox/3-reasons-walking-with-knee-arthritis/  If walking long distances, I recommend good quality well-cushioned shoes. Varsity sports can help you find the right ones: https://www.Playdom.Zumbox/  Aquatic and pool therapy is often helpful because it lessens the impact on the joint, strengthens the leg and thigh muscles, and helps to control swelling.   Knee motion is important to the health of the knee.     Knee Braces:  A compression knee sleeve can help limit swelling and provide proprioceptive feedback.     Prescriptions & Medications:  I do recommend formal physical therapy or at minimum a home exercise program.   Over the counter analgesic (pain-relieving) medications can help. Examples are Tylenol, Ibuprofen, and Aleve. Check with your primary care physician to make sure you don't have contra-indications to taking those medicines.  Some over the counter supplement solutions such as glucosamine and chondroitin may help with symptoms, although the evidence is mixed.    Healthy Lifestyle:  Excess body weight can have a negative impact on joint health and on pain. I recommend healthy lifestyle choices including nutrition and exercise that help reach and maintain an ideal body weight. Tips for Exercise: https://www.Travark.Zumbox/13-exercise-tips-for-a-healthier-you/  Some diets cause increased  inflammation. I recommend a balanced wholesome diet including some foods such as olives that are shown to decrease inflammation. More diet information available here: https://www.Scholrly.Yeelion/8-best-foods-for-knee-arthritis/     Follow-up: PRN or sooner if there are any problems between now and then.    Leave Review:   Google: Leave Google Review  Healthgrades: Leave Healthgrades Review    After Hours Number: [phone]

## 2023-04-21 ENCOUNTER — CLINICAL SUPPORT (OUTPATIENT)
Dept: REHABILITATION | Facility: HOSPITAL | Age: 53
End: 2023-04-21
Payer: COMMERCIAL

## 2023-04-21 DIAGNOSIS — M25.662 DECREASED RANGE OF MOTION OF LEFT KNEE: ICD-10-CM

## 2023-04-21 DIAGNOSIS — M62.81 PROXIMAL MUSCLE WEAKNESS: ICD-10-CM

## 2023-04-21 DIAGNOSIS — Z74.09 DECREASED FUNCTIONAL MOBILITY AND ENDURANCE: Primary | ICD-10-CM

## 2023-04-21 PROCEDURE — 97110 THERAPEUTIC EXERCISES: CPT

## 2023-04-21 PROCEDURE — 97530 THERAPEUTIC ACTIVITIES: CPT

## 2023-04-21 PROCEDURE — 97112 NEUROMUSCULAR REEDUCATION: CPT

## 2023-04-21 NOTE — PROGRESS NOTES
[facility]   Physical Therapy Treatment Note     Name: Corie Carreno  Clinic Number: [id number]    Therapy Diagnosis:   Encounter Diagnoses   Name Primary?    Decreased functional mobility and endurance Yes    Proximal muscle weakness     Decreased range of motion of left knee      Physician: Callum Lynn MD    Visit Date: 4/21/2023    Physician Orders: PT Eval and Treat  Medical Diagnosis from Referral: Chronic pain of left knee, Iliotibial band syndrome of left side, Primary osteoarthritis of left knee  Evaluation Date: 3/13/2023  Authorization Period Expiration: 3/9/24  Plan of Care Expiration: 5/13/2023  Progress Note Due: 4/13/2023  Visit # / Visits authorized: 7/20  FOTO: 1/3 (last performed on 3/13/2023)     Precautions: Standard    Time In: 1100  Time Out: 1201  Total Billable Time: 49 minutes (Billing reflects 1 on 1 treatment time spent with patient)    SUBJECTIVE     Patient reports: Patient reports she is feeling good and has minimal pain in the knee. She followed up with Dr. Lynn this week and states he was pleased with her progress so she only has to follow-up as needed.     She was compliant with home exercise program.  Response to previous treatment: had more pain after last session which resolved 3-4 days post last session.   Functional change: improved knee flexion ROM and hamstring mobility     Pain: 0/10     Location: Left  knee    OBJECTIVE     Objective Measures updated at progress report only unless specified.       TREATMENT     Corie received the treatments listed below:       THERAPEUTIC EXERCISES to develop strength, endurance, ROM, flexibility, posture, and core stabilization for (14) minutes including:    Performed Today:     Nu-step: Level 5 for 8 minutes   Stretches   Adductor (seated): 2x30 seconds  B   Kneeling flexion 3 minutes with 5s holds      Interventions DEFERRED today     Hamstring stretch (seated+Stool)   ITB stretch (seated+Stool)             NEUROMUSCULAR RE-EDUCATION ACTIVITIES to improve Balance, Coordination, Kinesthetic, Sense, Proprioception, and Posture for (10) minutes.  The following were included:    Performed Today:       Tandem stance + around the world 2# 3x10 each way B   Single leg stance 3x30s B      Interventions DEFERRED today     Standing TKE BHAVESH grey-cook band 3x10  Posterior pelvic tilt 2 minutes x 10s holds   Tabletop arm arc (ball): 3x10  Modified SL bridge isometric + march 3x10 B   Quadruped   Arm raises 10x B   Leg extensions 10x B   Bird dog 2x10 B   Prone hip extensions (knee flexed) 3x10 B   Clamshells 2x10 B with 5s holds          THERAPEUTIC ACTIVITIES to improve dynamic and functional  performance for (25) minutes including:    Performed Today:     Calf raises (stairs) 3x10   Knee extensions   DL 25# 3x10 B   Hamstring curls   DL: 35# 3x10  Shuttle   DL press (unstable): 6 bands 5 minutes   SL press: 4 bands 3x10 B  Interventions DEFERRED today     Bridge + abduction isometric GREEN 3x10    5s holds??  Side stepping RED 3 laps   Knee extensions   Eccentric 15# 3x10 B        Next Session:            PATIENT EDUCATION AND HOME EXERCISES     Home Exercises Provided and Patient Education Provided     Education provided: (time included with therex) minutes  PURPOSE: Patient educated on the impairments noted above and the effects of physical therapy intervention to improve overall condition and QOL.   EXERCISE: Patient was educated on all the above exercise prior/during/after for proper posture, positioning, and execution for safe performance with home exercise program.   STRENGTH: Patient educated on the importance of improved core and extremity strength in order to improve alignment of the spine and extremities with static positions and dynamic movement.   GAIT & BALANCE: Patient educated on the importance of strong core and lower extremity musculature in order to improve both static and dynamic balance, improve  gait mechanics, reduce fall risk and improve household and community mobility.     Written Home Exercises Provided: yes.  Exercises were reviewed and Corie was able to demonstrate them prior to the end of the session.  Corie demonstrated good  understanding of the education provided. See EMR under Patient Instructions for exercises provided during therapy sessions.    ASSESSMENT     Pt tolerates session well today and was able to make small progressions with reports of mild but tolerable symptom increase.    Response to Therapeutic Exercise: incorporated kneeling flexion to improve knee ROM with good tolerance noted by pt; significant improved flexion range accomplished   Response to Neuromuscular Re-education: improving balance noted with decrease decreased need for upper extremity support throughout interventions   Response to Therapeutic Activity: attempted to progress to eccentric knee extensions today; however, pt reports significant knee pain and thus intervention was regressed to DL knee extensions     Corie is progressing well towards her goals.   Pt prognosis is Good.     Pt will continue to benefit from skilled outpatient physical therapy to address the deficits listed in the problem list box on initial evaluation, provide pt/family education and to maximize pt's level of independence in the home and community environment.     Pt's spiritual, cultural and educational needs considered and pt agreeable to plan of care and goals.     Anticipated Barriers for therapy: co-morbidities, chronicity of condition, and adherence to treatment plan       Short Term Goals:  4 weeks Status  Date Met   PAIN: Pt will report worst pain of 5/10 in order to progress toward max functional ability and improve quality of life. [x] Progressing  [] Met  [] Not Met     FUNCTION: Patient will demonstrate improved function as indicated by a functional limitation score of less than or equal to 45 out of 100 on FOTO. [x]  Progressing  [] Met  [] Not Met     MOBILITY: Patient will improve AROM to 50% of stated goals, listed in objective measures above, in order to progress towards independence with functional activities.  [x] Progressing  [] Met  [] Not Met     STRENGTH: Patient will improve strength to 50% of stated goals, listed in objective measures above, in order to progress towards independence with functional activities.  [x] Progressing  [] Met  [] Not Met     POSTURE: Patient will correct postural deviations in sitting and standing, to decrease pain and promote long term stability.  [x] Progressing  [] Met  [] Not Met     HEP: Patient will demonstrate independence with HEP in order to progress toward functional independence. [x] Progressing  [] Met  [] Not Met        Long Term Goals:  8 weeks Status Date Met   PAIN: Pt will report worst pain of 2/10 in order to progress toward max functional ability and improve quality of life [x] Progressing  [] Met  [] Not Met     FUNCTION: Patient will demonstrate improved function as indicated by a functional limitation score of less than or equal to 39 out of 100 on FOTO. [x] Progressing  [] Met  [] Not Met     MOBILITY: Patient will improve AROM to stated goals, listed in objective measures above, in order to return to maximal functional potential and improve quality of life. [x] Progressing  [] Met  [] Not Met     STRENGTH: Patient will improve strength to stated goals, listed in objective measures above, in order to improve functional independence and quality of life. [x] Progressing  [] Met  [] Not Met     Patient will return to normal ADL's, IADL's, community involvement, recreational activities, and work-related activities with less than or equal to 2/10 pain and maximal function.  [x] Progressing  [] Met  [] Not Met         PLAN     Continue Plan of Care (POC) and progress per patient tolerance. See treatment section for details on planned progressions next session.      Colt  Abhijit, PTA

## 2023-04-24 ENCOUNTER — CLINICAL SUPPORT (OUTPATIENT)
Dept: REHABILITATION | Facility: HOSPITAL | Age: 53
End: 2023-04-24
Payer: COMMERCIAL

## 2023-04-24 DIAGNOSIS — M25.662 DECREASED RANGE OF MOTION OF LEFT KNEE: ICD-10-CM

## 2023-04-24 DIAGNOSIS — Z74.09 DECREASED FUNCTIONAL MOBILITY AND ENDURANCE: Primary | ICD-10-CM

## 2023-04-24 DIAGNOSIS — M62.81 PROXIMAL MUSCLE WEAKNESS: ICD-10-CM

## 2023-04-24 PROCEDURE — 97112 NEUROMUSCULAR REEDUCATION: CPT

## 2023-04-24 PROCEDURE — 97110 THERAPEUTIC EXERCISES: CPT

## 2023-04-24 PROCEDURE — 97530 THERAPEUTIC ACTIVITIES: CPT

## 2023-04-24 NOTE — PROGRESS NOTES
[facility]   Physical Therapy Treatment Note     Name: Corie Carreno  Clinic Number: [id number]    Therapy Diagnosis:   Encounter Diagnoses   Name Primary?    Decreased functional mobility and endurance Yes    Proximal muscle weakness     Decreased range of motion of left knee      Physician: Callum Lynn MD    Visit Date: 4/24/2023    Physician Orders: PT Eval and Treat  Medical Diagnosis from Referral: Chronic pain of left knee, Iliotibial band syndrome of left side, Primary osteoarthritis of left knee  Evaluation Date: 3/13/2023  Authorization Period Expiration: 3/9/24  Plan of Care Expiration: 5/13/2023  Progress Note Due: 4/13/2023  Visit # / Visits authorized: 8/20  FOTO: 1/3 (last performed on 3/13/2023)     Precautions: Standard    Time In: 1100  Time Out: 1202  Total Billable Time: 52 minutes (Billing reflects 1 on 1 treatment time spent with patient)    SUBJECTIVE     Patient reports: Patient notes that she continues to feel pretty good with only mild discomfort noted at medial knee     She was compliant with home exercise program.  Response to previous treatment: had more pain after last session which resolved 3-4 days post last session.   Functional change: improved knee flexion ROM and hamstring mobility     Pain: 0/10     Location: Left  knee    OBJECTIVE     Objective Measures updated at progress report only unless specified.       TREATMENT     Corie received the treatments listed below:       THERAPEUTIC EXERCISES to develop strength, endurance, ROM, flexibility, posture, and core stabilization for (12) minutes including:    Performed Today:     Nu-step: Level 5 for 8 minutes   Kneeling flexion 3 minutes with 5s holds (124º noted)      Interventions DEFERRED today     Stretches   Adductor (seated): 2x30 seconds  B   Hamstring stretch (seated+Stool)   ITB stretch (seated+Stool)            NEUROMUSCULAR RE-EDUCATION ACTIVITIES to improve Balance, Coordination,  Kinesthetic, Sense, Proprioception, and Posture for (12) minutes.  The following were included:    Performed Today:     Tandem walking 8 laps forward and backward   Modified single leg stance paloff press 2.5# 2x10 B   Bird dogs 2x10 B     Interventions DEFERRED today     Standing TKE BHAVESH grey-cook band 3x10  Posterior pelvic tilt 2 minutes x 10s holds   Tabletop arm arc (ball): 3x10  Modified SL bridge isometric + march 3x10 B   Quadruped   Arm raises 10x B   Leg extensions 10x B   Bird dog 2x10 B   Prone hip extensions (knee flexed) 3x10 B   Clamshells 2x10 B with 5s holds          THERAPEUTIC ACTIVITIES to improve dynamic and functional  performance for (28) minutes including:    Performed Today:     Calf raises (stairs) 3x10   Knee extensions   DL 35# 2x10 B, 25# 1x10   Hamstring curls   DL: 45# 3x10  Shuttle   DL press (unstable): 6 bands 5 minutes   SL press: 4 bands 3x10 B  Interventions DEFERRED today     Bridge + abduction isometric GREEN 3x10    5s holds??  Side stepping RED 3 laps   Knee extensions   Eccentric 15# 3x10 B        Next Session:            PATIENT EDUCATION AND HOME EXERCISES     Home Exercises Provided and Patient Education Provided     Education provided: (time included with therex) minutes  PURPOSE: Patient educated on the impairments noted above and the effects of physical therapy intervention to improve overall condition and QOL.   EXERCISE: Patient was educated on all the above exercise prior/during/after for proper posture, positioning, and execution for safe performance with home exercise program.   STRENGTH: Patient educated on the importance of improved core and extremity strength in order to improve alignment of the spine and extremities with static positions and dynamic movement.   GAIT & BALANCE: Patient educated on the importance of strong core and lower extremity musculature in order to improve both static and dynamic balance, improve gait mechanics, reduce fall risk and  improve household and community mobility.     Written Home Exercises Provided: yes.  Exercises were reviewed and Corie was able to demonstrate them prior to the end of the session.  Corie demonstrated good  understanding of the education provided. See EMR under Patient Instructions for exercises provided during therapy sessions.    ASSESSMENT     Pt tolerates session well today and was able to make small progressions with reports of mild but tolerable symptom increase.    Response to Therapeutic Exercise: again incorporated kneeling flexion to improve knee ROM with good tolerance noted by pt; accomplished 124º knee flexion following intervention   Response to Neuromuscular Re-education: added single leg stance paloff press to improve SL stability and posterolateral hip strength   Response to Therapeutic Activity: progressed shuttle leg press to unstable surface to further challenge lower extremity musculature and improve functional strength; significant increased challenge noted by pt. Good form maintained throughout     Corie is progressing well towards her goals.   Pt prognosis is Good.     Pt will continue to benefit from skilled outpatient physical therapy to address the deficits listed in the problem list box on initial evaluation, provide pt/family education and to maximize pt's level of independence in the home and community environment.     Pt's spiritual, cultural and educational needs considered and pt agreeable to plan of care and goals.     Anticipated Barriers for therapy: co-morbidities, chronicity of condition, and adherence to treatment plan       Short Term Goals:  4 weeks Status  Date Met   PAIN: Pt will report worst pain of 5/10 in order to progress toward max functional ability and improve quality of life. [x] Progressing  [] Met  [] Not Met     FUNCTION: Patient will demonstrate improved function as indicated by a functional limitation score of less than or equal to 45 out of 100 on FOTO. [x]  Progressing  [] Met  [] Not Met     MOBILITY: Patient will improve AROM to 50% of stated goals, listed in objective measures above, in order to progress towards independence with functional activities.  [x] Progressing  [] Met  [] Not Met     STRENGTH: Patient will improve strength to 50% of stated goals, listed in objective measures above, in order to progress towards independence with functional activities.  [x] Progressing  [] Met  [] Not Met     POSTURE: Patient will correct postural deviations in sitting and standing, to decrease pain and promote long term stability.  [x] Progressing  [] Met  [] Not Met     HEP: Patient will demonstrate independence with HEP in order to progress toward functional independence. [x] Progressing  [] Met  [] Not Met        Long Term Goals:  8 weeks Status Date Met   PAIN: Pt will report worst pain of 2/10 in order to progress toward max functional ability and improve quality of life [x] Progressing  [] Met  [] Not Met     FUNCTION: Patient will demonstrate improved function as indicated by a functional limitation score of less than or equal to 39 out of 100 on FOTO. [x] Progressing  [] Met  [] Not Met     MOBILITY: Patient will improve AROM to stated goals, listed in objective measures above, in order to return to maximal functional potential and improve quality of life. [x] Progressing  [] Met  [] Not Met     STRENGTH: Patient will improve strength to stated goals, listed in objective measures above, in order to improve functional independence and quality of life. [x] Progressing  [] Met  [] Not Met     Patient will return to normal ADL's, IADL's, community involvement, recreational activities, and work-related activities with less than or equal to 2/10 pain and maximal function.  [x] Progressing  [] Met  [] Not Met         PLAN     Continue Plan of Care (POC) and progress per patient tolerance. See treatment section for details on planned progressions next session.      Colt  Abhijit, PTA

## 2023-04-25 ENCOUNTER — NUTRITION (OUTPATIENT)
Dept: INTERNAL MEDICINE | Facility: CLINIC | Age: 53
End: 2023-04-25
Payer: COMMERCIAL

## 2023-04-25 DIAGNOSIS — Z71.3 DIETARY COUNSELING: Primary | ICD-10-CM

## 2023-04-25 PROCEDURE — 97802 MEDICAL NUTRITION INDIV IN: CPT | Mod: S$GLB,,, | Performed by: DIETITIAN, REGISTERED

## 2023-04-25 PROCEDURE — 97802 PR MED NUTR THER, 1ST, INDIV, EA 15 MIN: ICD-10-PCS | Mod: S$GLB,,, | Performed by: DIETITIAN, REGISTERED

## 2023-04-25 NOTE — PROGRESS NOTES
"Nutrition Assessment  Session Time:  35 minutes      Client name:  Corie Carreno  :  1970  Age:  52 y.o.  Gender:  female    Client states:  referred by Juan Miguel Aj MD with a diagnosis of:   -Body mass index 45.0-49.9, adult    Present today to discuss weight loss.    Reports high weight of 345 lbs in the past.  Lost approximately 40 lbs.  Weight is now stalling for 3-6 months.     Changes so far:  -Replaced some meals with smoothies (strawberry pineapple or triple berry blend with oj and oatmeal and maybe sugar in the raw added) or from smoothie [place]. 1x/day, 3x/week. For breakfast of lunch.   -Reduced portions.  -no fried days  -increased water intake, currently 6-7 16.9oz bottles per day  -walking for exercise with  + [employer] noemi (exercise noemi), 3x/week    Hurt knee recently (2 months ago).  Currently in physical therapy. Released on Friday.     When in the office reports being hungrier and wanting to eat.  When home will skip breakfast and first meal may be at 1-2pm.      Current drinks: sweet tea (2x/week), lemonade (3x/week)  Alcohol: none  Breakfast choices: grits with maybe cheese + eggs + fonseca// sausage and egg biscuit  Snack: not often// pear or apple// cranberry kind bar// simply ruffles   Lunch/Dinner: beans// chicken or pork chops stewed or baked// greens// pasta with white sauce// jambalaya// meat loaf + mashed potatoes// vegetables: corn, green beans, peas, lima beans, beans   2-3 meals per day   Dine out: 1-2x/week// [employer], [employer]    Anthropometrics  Height:  68"     Weight:  310 lbs  BMI:  47.2  % Body Fat:  n/a    Clinical Signs/Symptoms  N/V/D:  none noted  Appetite:  good       Past Medical History:   Diagnosis Date    Arthritis     Hypertension     Spondylolisthesis        Past Surgical History:   Procedure Laterality Date    CHOLECYSTECTOMY      HYSTERECTOMY      TONSILLECTOMY         Medications    has a current medication list which " includes the following prescription(s): alprazolam, amlodipine, budesonide, zyrtec, cholestyramine-aspartame, clindamycin-benzoyl peroxide, desonide, epinephrine, fluticasone propionate, folic acid/vit b complex and c, irbesartan, lexapro, montelukast, multivit,calc,mins/iron/folic, mupirocin, prednisolone acetate, triamterene-hydrochlorothiazide 37.5-25 mg, and vitamin d.    Vitamins, Minerals, and/or Supplements:  not discussed     Food/Medication Interactions:  Reviewed     Food Allergies or Intolerances:  NKFA     Social History    Marital status:    Employment:  yes    Social History     Tobacco Use    Smoking status: Never    Smokeless tobacco: Never   Substance Use Topics    Alcohol use: Not on file        Lab Reports   Sodium   Date Value Ref Range Status   08/03/2005 141 136 - 145 mMol/l Final     Potassium   Date Value Ref Range Status   08/03/2005 3.7 3.3 - 5.3 mMol/l Final     Chloride   Date Value Ref Range Status   08/03/2005 94 (L) 95 - 110 mMol/l Final     CO2   Date Value Ref Range Status   08/03/2005 26 23.0 - 29.0 mEq/L Final     Glucose   Date Value Ref Range Status   08/03/2005 89 70 - 110 mg/dl Final     BUN   Date Value Ref Range Status   08/03/2005 12 5 - 23 mg/dl Final     Creatinine   Date Value Ref Range Status   08/03/2005 1.0 0.5 - 1.4 mg/dl Final     Calcium   Date Value Ref Range Status   08/03/2005 9.2 8.7 - 10.5 mg/dl Final     Total Protein   Date Value Ref Range Status   08/03/2005 7.9 6.0 - 8.4 gm/dl Final     Albumin   Date Value Ref Range Status   08/03/2005 4.3 3.5 - 5.2 g/dl Final     Comment:     Please note new adult reference range effective 08/11/04.     Total Bilirubin   Date Value Ref Range Status   08/03/2005 0.4 0.1 - 1.0 mg/dl Final     Alkaline Phosphatase   Date Value Ref Range Status   08/03/2005 89 45 - 130 U/L Final     AST   Date Value Ref Range Status   08/03/2005 6 0 - 31 U/L Final     ALT   Date Value Ref Range Status   08/03/2005 11 0 - 31 U/L Final       Lab Results   Component Value Date    WBC 10.60 07/15/2005    HGB 12.1 07/15/2005    HCT 34.2 (L) 07/15/2005    MCV 90.7 07/15/2005     07/15/2005        Lab Results   Component Value Date    CHOL 175 04/07/2005     Lab Results   Component Value Date    HDL 36.0 (L) 04/07/2005     Lab Results   Component Value Date    LDLCALC 108.2 04/07/2005     Lab Results   Component Value Date    TRIG 154 (H) 04/07/2005     Lab Results   Component Value Date    CHOLHDL 20.6 04/07/2005     Lab Results   Component Value Date    HGBA1C 5.2 04/07/2005     BP Readings from Last 1 Encounters:   No data found for BP       Food History  Listed above    Exercise History:  listed above    Cultural/Spiritual/Personal Preferences:  None identified    Support System:  spouse    State of Change:  Action    Barriers to Change:  none    Diagnosis    obesity related to excess energy intake  as evidenced by BMI 47.    Intervention    RMR (Method:  [place]. Jeor):  2070 kcal  Activity Factor:  1.3    DIDIER:  2691 - 750 = 1941 kcal    Goals:  1.  Replace sugary beverages with sugar free alternatives discussed (Crystal Light, Minute Maid Zero Sugar)  2.  Resume previous exercise routine and increase frequency/duration/intensity as able  3.  Increase intake of non-starchy vegetables and decrease intake of carbohydrates    Nutrition Education  The following education was provided to the patient:  Complimented patient on dietary compliance/modifications and resulting health improvements.  Discussed meal planning/USDA's My Plate design.  Discussed weight management.  Suggested dietary modifications based on current dietary behaviors and individual food preferences.  Discussed nutrition-related lab values and dietary and/or lifestyle factors affecting them.    Patient verbalized understanding of nutrition education and recommendations received.    Handouts Provided  Balanced Plate    Monitoring/Evaluation    Monitor the  following:  Weight  BMI  Caloric intake  Labs:  lipid panel    Follow Up Plan:  as needed

## 2023-04-28 ENCOUNTER — CLINICAL SUPPORT (OUTPATIENT)
Dept: REHABILITATION | Facility: HOSPITAL | Age: 53
End: 2023-04-28
Payer: COMMERCIAL

## 2023-04-28 DIAGNOSIS — Z74.09 DECREASED FUNCTIONAL MOBILITY AND ENDURANCE: Primary | ICD-10-CM

## 2023-04-28 DIAGNOSIS — M25.662 DECREASED RANGE OF MOTION OF LEFT KNEE: ICD-10-CM

## 2023-04-28 DIAGNOSIS — M62.81 PROXIMAL MUSCLE WEAKNESS: ICD-10-CM

## 2023-04-28 PROCEDURE — 97110 THERAPEUTIC EXERCISES: CPT

## 2023-04-28 PROCEDURE — 97530 THERAPEUTIC ACTIVITIES: CPT

## 2023-04-28 PROCEDURE — 97112 NEUROMUSCULAR REEDUCATION: CPT

## 2023-04-28 NOTE — PROGRESS NOTES
[facility]   Physical Therapy Treatment Note     Name: Corie Carreno  Clinic Number: [id number]    Therapy Diagnosis:   Encounter Diagnoses   Name Primary?    Decreased functional mobility and endurance Yes    Proximal muscle weakness     Decreased range of motion of left knee      Physician: Callum Lynn MD    Visit Date: 4/28/2023    Physician Orders: PT Eval and Treat  Medical Diagnosis from Referral: Chronic pain of left knee, Iliotibial band syndrome of left side, Primary osteoarthritis of left knee  Evaluation Date: 3/13/2023  Authorization Period Expiration: 3/9/24  Plan of Care Expiration: 5/13/2023  Progress Note Due: 4/13/2023  Visit # / Visits authorized: 8/20  FOTO: 1/3 (last performed on 3/13/2023)     Precautions: Standard    Time In: 1100  Time Out: 1202  Total Billable Time: 52 minutes (Billing reflects 1 on 1 treatment time spent with patient)    SUBJECTIVE     Patient reports: Patient notes that she continues to feel pretty good with only mild discomfort noted at medial knee     She was compliant with home exercise program.  Response to previous treatment: had more pain after last session which resolved 3-4 days post last session.   Functional change: improved knee flexion ROM and hamstring mobility     Pain: 0/10     Location: Left  knee    OBJECTIVE     Objective Measures updated at progress report only unless specified.       TREATMENT     Corie received the treatments listed below:       THERAPEUTIC EXERCISES to develop strength, endurance, ROM, flexibility, posture, and core stabilization for (12) minutes including:    Performed Today:     Nu-step: Level 5 for 8 minutes   Kneeling flexion 3 minutes with 5s holds (124º noted)      Interventions DEFERRED today     Stretches   Adductor (seated): 2x30 seconds  B   Hamstring stretch (seated+Stool)   ITB stretch (seated+Stool)            NEUROMUSCULAR RE-EDUCATION ACTIVITIES to improve Balance, Coordination,  Kinesthetic, Sense, Proprioception, and Posture for (12) minutes.  The following were included:    Performed Today:     Tandem walking 8 laps forward and backward   Modified single leg stance paloff press 2.5# 2x10 B   Bird dogs 2x10 B     Interventions DEFERRED today     Standing TKE BHAVESH grey-cook band 3x10  Posterior pelvic tilt 2 minutes x 10s holds   Tabletop arm arc (ball): 3x10  Modified SL bridge isometric + march 3x10 B   Quadruped   Arm raises 10x B   Leg extensions 10x B   Bird dog 2x10 B   Prone hip extensions (knee flexed) 3x10 B   Clamshells 2x10 B with 5s holds          THERAPEUTIC ACTIVITIES to improve dynamic and functional  performance for (28) minutes including:    Performed Today:     Calf raises (stairs) 3x10   Knee extensions   DL 35# 2x10 B, 25# 1x10   Hamstring curls   DL: 45# 3x10  Shuttle   DL press (unstable): 6 bands 5 minutes   SL press: 4 bands 3x10 B   Calf raises 4 bands 3x10  Interventions DEFERRED today     Bridge + abduction isometric GREEN 3x10    5s holds??  Side stepping RED 3 laps   Knee extensions   Eccentric 15# 3x10 B        Next Session:            PATIENT EDUCATION AND HOME EXERCISES     Home Exercises Provided and Patient Education Provided     Education provided: (time included with therex) minutes  PURPOSE: Patient educated on the impairments noted above and the effects of physical therapy intervention to improve overall condition and QOL.   EXERCISE: Patient was educated on all the above exercise prior/during/after for proper posture, positioning, and execution for safe performance with home exercise program.   STRENGTH: Patient educated on the importance of improved core and extremity strength in order to improve alignment of the spine and extremities with static positions and dynamic movement.   GAIT & BALANCE: Patient educated on the importance of strong core and lower extremity musculature in order to improve both static and dynamic balance, improve gait  mechanics, reduce fall risk and improve household and community mobility.     Written Home Exercises Provided: yes.  Exercises were reviewed and Corie was able to demonstrate them prior to the end of the session.  Corie demonstrated good  understanding of the education provided. See EMR under Patient Instructions for exercises provided during therapy sessions.    ASSESSMENT     Pt tolerates session well today and was able to make small progressions with reports of mild but tolerable symptom increase.    Response to Therapeutic Exercise: again incorporated kneeling flexion to improve knee ROM with good tolerance noted by pt; accomplished 124º knee flexion following intervention   Response to Neuromuscular Re-education: added single leg stance paloff press to improve SL stability and posterolateral hip strength   Response to Therapeutic Activity: progressed shuttle leg press to unstable surface to further challenge lower extremity musculature and improve functional strength; significant increased challenge noted by pt. Good form maintained throughout     Corie is progressing well towards her goals.   Pt prognosis is Good.     Pt will continue to benefit from skilled outpatient physical therapy to address the deficits listed in the problem list box on initial evaluation, provide pt/family education and to maximize pt's level of independence in the home and community environment.     Pt's spiritual, cultural and educational needs considered and pt agreeable to plan of care and goals.     Anticipated Barriers for therapy: co-morbidities, chronicity of condition, and adherence to treatment plan       Short Term Goals:  4 weeks Status  Date Met   PAIN: Pt will report worst pain of 5/10 in order to progress toward max functional ability and improve quality of life. [x] Progressing  [] Met  [] Not Met     FUNCTION: Patient will demonstrate improved function as indicated by a functional limitation score of less than or equal  to 45 out of 100 on FOTO. [x] Progressing  [] Met  [] Not Met     MOBILITY: Patient will improve AROM to 50% of stated goals, listed in objective measures above, in order to progress towards independence with functional activities.  [x] Progressing  [] Met  [] Not Met     STRENGTH: Patient will improve strength to 50% of stated goals, listed in objective measures above, in order to progress towards independence with functional activities.  [x] Progressing  [] Met  [] Not Met     POSTURE: Patient will correct postural deviations in sitting and standing, to decrease pain and promote long term stability.  [x] Progressing  [] Met  [] Not Met     HEP: Patient will demonstrate independence with HEP in order to progress toward functional independence. [x] Progressing  [] Met  [] Not Met        Long Term Goals:  8 weeks Status Date Met   PAIN: Pt will report worst pain of 2/10 in order to progress toward max functional ability and improve quality of life [x] Progressing  [] Met  [] Not Met     FUNCTION: Patient will demonstrate improved function as indicated by a functional limitation score of less than or equal to 39 out of 100 on FOTO. [x] Progressing  [] Met  [] Not Met     MOBILITY: Patient will improve AROM to stated goals, listed in objective measures above, in order to return to maximal functional potential and improve quality of life. [x] Progressing  [] Met  [] Not Met     STRENGTH: Patient will improve strength to stated goals, listed in objective measures above, in order to improve functional independence and quality of life. [x] Progressing  [] Met  [] Not Met     Patient will return to normal ADL's, IADL's, community involvement, recreational activities, and work-related activities with less than or equal to 2/10 pain and maximal function.  [x] Progressing  [] Met  [] Not Met         PLAN     Continue Plan of Care (POC) and progress per patient tolerance. See treatment section for details on planned  progressions next session.      Colt Lacey, PTA

## 2024-08-05 ENCOUNTER — PATIENT MESSAGE (OUTPATIENT)
Dept: ORTHOPEDICS | Facility: CLINIC | Age: 54
End: 2024-08-05
Payer: COMMERCIAL

## 2024-08-07 ENCOUNTER — TELEPHONE (OUTPATIENT)
Dept: PAIN MEDICINE | Facility: CLINIC | Age: 54
End: 2024-08-07
Payer: COMMERCIAL

## 2024-08-07 ENCOUNTER — CLINICAL SUPPORT (OUTPATIENT)
Dept: REHABILITATION | Facility: HOSPITAL | Age: 54
End: 2024-08-07
Payer: COMMERCIAL

## 2024-08-07 DIAGNOSIS — M62.81 PROXIMAL MUSCLE WEAKNESS: ICD-10-CM

## 2024-08-07 DIAGNOSIS — Z74.09 DECREASED FUNCTIONAL MOBILITY AND ENDURANCE: Primary | ICD-10-CM

## 2024-08-07 DIAGNOSIS — M54.30 SCIATICA, UNSPECIFIED LATERALITY: Primary | ICD-10-CM

## 2024-08-07 DIAGNOSIS — M54.30 SCIATICA, UNSPECIFIED LATERALITY: ICD-10-CM

## 2024-08-07 DIAGNOSIS — M25.662 DECREASED RANGE OF MOTION OF LEFT KNEE: ICD-10-CM

## 2024-08-07 PROCEDURE — 97110 THERAPEUTIC EXERCISES: CPT

## 2024-08-07 PROCEDURE — 97162 PT EVAL MOD COMPLEX 30 MIN: CPT

## 2024-08-07 NOTE — PLAN OF CARE
[facility]   Physical Therapy Initial Evaluation      Date: 8/7/2024   Name: Corie Carreno  Clinic Number: [id number]    Therapy Diagnosis:    Encounter Diagnoses   Name Primary?    Sciatica, unspecified laterality     Decreased functional mobility and endurance Yes    Proximal muscle weakness     Decreased range of motion of left knee       Physician: Juan Miguel Aj MD     Physician Orders: PT Eval and Treat  Medical Diagnosis from Referral: Sciatica, unspecified laterality [M54.30]   Evaluation Date: 8/7/2024  Authorization Period Expiration: 8/7/2025   Plan of Care Expiration: 10/7/2024  Progress Note Due: 9/7/2024  Visit # / Visits authorized: 1/1  FOTO: 1/3 (last performed on 8/7/2024)    Precautions: Standard    Time In: 1700  Time Out: 1758  Total Billable Time (timed & untimed codes): 52 minutes    Subjective     Date of onset: ~1 month ago    History of current condition - Corie reports to therapy due to a recent flare in sciatic symptoms. States that we had incorporated some exercises to help this last time she was in PT and noted significant relief until recently. States insidious onset but does note that she [facility] and doing faster paced walking which may have contributed.       Imaging: [] Xray [] MRI [] CT: Performed on: NA    Pain:  Current 6/10, worst 8/10, best 1/10   Location: [x] Right   [] Left:  glutes, down the hip, thigh and calf  Description: aching, burning, nerve zings   Aggravating Factors: doing household chores all day, prolonged sitting, prolonged walking.   Easing Factors: activity avoidance, rest    Prior Therapy:   [] N/A    [x] Yes: when she was pregnant. Also incorporated exercises for this last time she was in PT  Social History: Pt lives with their family  Occupation: Pt is retired   Prior Level of Function: Independent and pain free with all ADL, IADL, community mobility and functional activities.    Current Level of Function: Independent with all ADL, IADL, community mobility and functional activities with reports of increased pain and need for increased time and frequent breaks.      Dominant Extremity:    [x] Right    [] Left    Pts goals: Pt reported goals are to decrease overall pain levels in order to return to prior functional level.     Medical History:   Past Medical History:   Diagnosis Date    Arthritis     Hypertension     Spondylolisthesis        Surgical History:   Corie Carreno  has a past surgical history that includes Hysterectomy; Tonsillectomy; and Cholecystectomy.    Medications:   Corie has a current medication list which includes the following prescription(s): alprazolam, amlodipine, budesonide, zyrtec, cholestyramine-aspartame, clindamycin-benzoyl peroxide, desonide, epinephrine, fluticasone propionate, folic acid/vit b complex and c, irbesartan, lexapro, montelukast, multivit,calc,mins/iron/folic, mupirocin, prednisolone acetate, triamterene-hydrochlorothiazide 37.5-25 mg, and vitamin d.    Allergies:   Review of patient's allergies indicates:   Allergen Reactions    Doxycycline Other (See Comments)     Other reaction(s): Headache      Adhesive Blisters, Itching and Rash    Clindamycin Rash    Sulfa (sulfonamide antibiotics) Hives and Rash        Objective        RANGE OF MOTION: (* denotes pain)  Lumbar ROM Right  (spine) Left   Dysfunction with Movement Goal   Lumbar Flexion (60º) 100% ---     Lumbar Extension (30º) 50% --- Tightness in the low back  75%   Lumbar Side Bending (25º) 100% 100% Mild pulling and R lateral flank     Lumbar Rotation 75% 75%         Hip AROM/PROM Right Left Dysfunction with Movement Goal   Hip Flexion (120º) 110 100 Body habitus     Hip Extension (30º) 10 10  20   Hip Abduction (45º) 45 45     Hip IR (45º) 25 25  35   Hip ER (45º) 45 45           STRENGTH: (* denotes pain)  L/E MMT Right  (spine) Left Dysfunction with Movement Goal   Modified (90/90)  Abdominal Strength   ---     Hip Flexion  4/5 4/5 R side feels weaker and like muscles are straining  4+/5 B   Hip Extension  4-/5 3+/5 Hip pain on R  4+/5 B   Hip Abduction  4/5 4/5  4+/5 B   Knee Extension 5/5 5/5 Hip pain on R  5/5 B   Knee Flexion 4/5 4+/5 Hip pain on R  5/5 B   Hip IR 4-/5 4/5 Hip pain on R  4+/5 B   Hip ER 4+/5 4+/5  4+/5 B   Ankle DF 5/5 5/5  5/5 B   Ankle PF 4-/5 4-/5  5/5 B        SPECIAL TESTS:    Right  (spine) Left  Goal   Lumbar Compression Negative --- Negative B    SLUMP Positive Negative Negative B    Straight Leg Raise Positive Negative Negative B            SENSATION:  Intact to Light Touch        PALPATION: Muscles: Increased tone and tenderness to palpation of: right , glutes, piriformis.       POSTURE:  Pt presents with postural abnormalities which include: forward head, rounded shoulders , and increased thoracic kyphosis         GAIT ANALYSIS The patient ambulated with the following assistive device: none; the pt presents with the following gait abnormalities: compensated trandelenberg    FUNCTIONAL MOVEMENT PATTERNS  Movement Analysis Notes   Bed Mobility  FUNCTIONAL, NONPAINFUL    Transfers FUNCTIONAL, NONPAINFUL    Sit to Stand FUNCTIONAL, PAINFUL  Mild B genu valgus. Mild B knee pain    Squat FUNCTIONAL, NONPAINFUL    Single Leg Squat      Step Down      Calf raises  DYSFUNCTIONAL, NONPAINFUL 10/10 double leg. Decreased vertical displacement        BALANCE:   Right   (seconds) Left  (seconds) Pain/dysfunction Noted Goal   Narrow Base of Support  ---  60+ seconds   Tandem Stance 29 13  60+ seconds   Single Leg Stance 10 3  60+ seconds                Function:     Intake Outcome Measure for FOTO Hip Survey    Therapist reviewed FOTO scores for [facility] on 8/7/2024.   FOTO report - see Media section or FOTO account for episode details    Intake Score: 53%         Treatment     Total Treatment time (time-based codes) separate from Evaluation: (8) minutes     Corie received the  treatments listed below:    CPT Intervention Performed   Today Duration / Intensity   MT         TE Hamstring stretch - strap x 2x1 min      Single knee to chest -strap x 2x30s      Piriformis stretch   Figure 4 push and pull x 2 mins x 10s holds each    NMR                             TA                                                                               PLAN               CPT Codes available for Billing:   (00) minutes of Manual therapy (MT) to improve pain and ROM.  (08) minutes of Therapeutic Exercise (TE) to develop strength, endurance, range of motion, and flexibility.  (00) minutes of Neuromuscular Re-Education (NMR)  to improve: Balance, Coordination, Kinesthetic, Sense, Proprioception, and Posture.  (00) minutes of Therapeutic Activities (TA) to improve functional performance.  Vasopneumatic Device Therapy () for management of swelling/edema. ([id number])  Unattended Electrical Stimulation (ES) for muscle performance or pain modulation.  BFR: Blood flow restriction applied during exercise        Patient Education and Home Exercises     Education provided: (time included with treatment) minutes  PURPOSE: Patient educated on the impairments noted above and the effects of physical therapy intervention to improve overall condition and QOL.   EXERCISE: Patient was educated on all the above exercise prior/during/after for proper posture, positioning, and execution for safe performance with home exercise program.   STRENGTH: Patient educated on the importance of improved core and extremity strength in order to improve alignment of the spine and extremities with static positions and dynamic movement.   GAIT & BALANCE: Patient educated on the importance of strong core and lower extremity musculature in order to improve both static and dynamic balance, improve gait mechanics, reduce fall risk and improve household and community mobility.     Written Home Exercises Provided: yes.  Exercises were reviewed and  "Corie was able to demonstrate them prior to the end of the session.  Corie demonstrated good  understanding of the education provided. See EMR under Patient Instructions for exercises provided during therapy sessions.    Assessment     Corie is a 53 y.o. female referred to outpatient Physical Therapy with a medical diagnosis of Sciatica. Pt presents with impairments in the following categories: IMPAIRMENTS: ROM, strength, balance, core strength and stability, and functional movement patterns    Pt prognosis is Excellent  Pt will benefit from skilled outpatient Physical Therapy to address the deficits stated above and in the chart below, provide pt/family education, and to maximize pt's level of independence.     Plan of care discussed with patient: Yes  Pt's spiritual, cultural and educational needs considered and patient is agreeable to the plan of care and goals as stated below:     Anticipated Barriers for therapy: none    Medical Necessity is demonstrated by the following  History  Co-morbidities and personal factors that may impact the plan of care [] LOW: no personal factors / co-morbidities  [x] MODERATE: 1-2 personal factors / co-morbidities  [] HIGH: 3+ personal factors / co-morbidities    Moderate / High Support Documentation: high BMI  Past Medical History:   Diagnosis Date    Arthritis     Hypertension     Spondylolisthesis         Examination  Body Structures and Functions, activity limitations and participation restrictions that may impact the plan of care [] LOW: addressing 1-2 elements  [x] MODERATE: 3+ elements  [] HIGH: 4+ elements (please support below)    Moderate / High Support Documentation: See above in "Current Level of Function"      Clinical Presentation [] LOW: stable  [x] MODERATE: Evolving  [] HIGH: Unstable     Decision Making/ Complexity Score: moderate         Short Term Goals:  4 weeks Status  Date Met   PAIN: Pt will report worst pain of 5/10 in order to progress toward max " functional ability and improve quality of life. [x] Progressing  [] Met  [] Not Met    FUNCTION: Patient will demonstrate improved function as indicated by a score of greater than or equal to 61 out of 100 on FOTO. [x] Progressing  [] Met  [] Not Met    MOBILITY: Patient will improve AROM to 50% of stated goals, listed in objective measures above, in order to progress towards independence with functional activities.  [x] Progressing  [] Met  [] Not Met    STRENGTH: Patient will improve strength to 50% of stated goals, listed in objective measures above, in order to progress towards independence with functional activities. [x] Progressing  [] Met  [] Not Met    HEP: Patient will demonstrate independence with HEP in order to progress toward functional independence. [x] Progressing  [] Met  [] Not Met      Long Term Goals:  8 weeks Status Date Met   PAIN: Pt will report worst pain of 2/10 in order to progress toward max functional ability and improve quality of life [x] Progressing  [] Met  [] Not Met    FUNCTION: Patient will demonstrate improved function as indicated by a score of greater than or equal to 70 out of 100 on FOTO. [x] Progressing  [] Met  [] Not Met    MOBILITY: Patient will improve AROM to stated goals, listed in objective measures above, in order to return to maximal functional potential and improve quality of life.  [x] Progressing  [] Met  [] Not Met    STRENGTH: Patient will improve strength to stated goals, listed in objective measures above, in order to improve functional independence and quality of life.  [x] Progressing  [] Met  [] Not Met    GAIT: Patient will demonstrate normalized gait mechanics with minimal compensation in order to return to PLOF. [x] Progressing  [] Met  [] Not Met    Patient will return to normal ADL's, IADL's, community involvement, recreational activities, and work-related activities with less than or equal to 2/10 pain and maximal function.  [x] Progressing  [] Met  []  Not Met      Plan     Plan of care Certification: 8/7/2024 to 10/7/2024.    Outpatient Physical Therapy 2 times weekly for 8 weeks to include any combination of the following interventions: virtual visits, dry needling, modalities, electrical stimulation (IFC, Pre-Mod, Attended with Functional Dry Needling), Cervical/Lumbar Traction, Gait Training, Manual Therapy, Neuromuscular Re-ed, Patient Education, Self Care, Therapeutic Exercise, and Therapeutic Activites     Yanni Oh, PT, DPT

## 2024-08-12 ENCOUNTER — CLINICAL SUPPORT (OUTPATIENT)
Dept: REHABILITATION | Facility: HOSPITAL | Age: 54
End: 2024-08-12
Payer: COMMERCIAL

## 2024-08-12 DIAGNOSIS — M62.81 PROXIMAL MUSCLE WEAKNESS: ICD-10-CM

## 2024-08-12 DIAGNOSIS — Z74.09 DECREASED FUNCTIONAL MOBILITY AND ENDURANCE: Primary | ICD-10-CM

## 2024-08-12 PROCEDURE — 97112 NEUROMUSCULAR REEDUCATION: CPT

## 2024-08-12 PROCEDURE — 97110 THERAPEUTIC EXERCISES: CPT

## 2024-08-12 PROCEDURE — 97530 THERAPEUTIC ACTIVITIES: CPT

## 2024-08-12 PROCEDURE — 97140 MANUAL THERAPY 1/> REGIONS: CPT

## 2024-08-13 NOTE — PROGRESS NOTES
[facility]   Physical Therapy Treatment Note        Name: Corie Carreno  Clinic Number: [id number]    Therapy Diagnosis:   Encounter Diagnoses   Name Primary?    Decreased functional mobility and endurance Yes    Proximal muscle weakness      Physician: Juan Miguel Aj MD    Visit Date: 8/12/2024    Physician Orders: PT Eval and Treat  Medical Diagnosis from Referral: Sciatica, unspecified laterality [M54.30]   Evaluation Date: 8/7/2024  Authorization Period Expiration: 8/7/2025   Plan of Care Expiration: 10/7/2024  Progress Note Due: 9/7/2024  Visit # / Visits authorized: 1/12 (+1 for evaluation)   FOTO: 1/3 (last performed on 8/7/2024)     Precautions: Standard     Time In: 1030  Time Out: 1132  Total Billable Time: 54 minutes (Billing reflects 1 on 1 treatment time spent with patient)    Subjective     Patient reports: She is feeling a little better compared to her initial evaluation. States she felt great for the rest of the day following her evaluation but that her pain started to return the next day .    He/She was compliant with home exercise program.  Response to previous treatment: significant decrease in pain for ~ 1 day   Functional change: no significant changes noted     Pain: NT/10     Location: R hip to posterior thigh     Objective      Objective Measures updated at progress report or POC update only unless otherwise noted.       Treatment     Corie received the treatments listed below:       CPT Intervention Performed   Today Duration / Intensity   MT Soft tissue mobilization  x R glutes and piriformis    TE Recumbent bike  x Level 2 for 6 mins      Hamstring stretch - strap   2x1 min      Single knee to chest -strap   2x30s      Piriformis stretch   Figure 4 push and pull x 2 mins x 10s holds each    NMR Posterior pelvic tilt  x 20x      Clamshells  x 3x10 b      Single leg stance  x 3x30s b      Sciatic nerve glides - slump position x 2 mins x 5s holds each direction    TA Bridges  x 3x10      Leg press  x  x Double le# 3x10   Single le# 3x10      Knee extensions  x Double le# 3x10      Hamstring curls  x Double le# 3x10      Lateral squat walks x Red band 4 laps along mirror                                  PLAN               CPT Codes available for Billing:   (08) minutes of Manual therapy (MT) to improve pain and ROM.  (08) minutes of Therapeutic Exercise (TE) to develop strength, endurance, range of motion, and flexibility.  (15) minutes of Neuromuscular Re-Education (NMR)  to improve: Balance, Coordination, Kinesthetic, Sense, Proprioception, and Posture.  (23) minutes of Therapeutic Activities (TA) to improve functional performance.  Vasopneumatic Device Therapy () for management of swelling/edema. (04652)  Unattended Electrical Stimulation (ES) for muscle performance or pain modulation.  BFR: Blood flow restriction applied during exercise    Patient Education and Home Exercises       Home Exercises Provided and Patient Education Provided     Education provided: (time included with treatment) minutes  PURPOSE: Patient educated on the impairments noted above and the effects of physical therapy intervention to improve overall condition and QOL.   EXERCISE: Patient was educated on all the above exercise prior/during/after for proper posture, positioning, and execution for safe performance with home exercise program.   STRENGTH: Patient educated on the importance of improved core and extremity strength in order to improve alignment of the spine and extremities with static positions and dynamic movement.   GAIT & BALANCE: Patient educated on the importance of strong core and lower extremity musculature in order to improve both static and dynamic balance, improve gait mechanics, reduce fall risk and improve household and community mobility.     Written Home Exercises Provided: yes.  Exercises were reviewed and Corie was able to demonstrate them prior to the  end of the session.  Corie demonstrated good  understanding of the education provided. See EMR under Patient Instructions for exercises provided during therapy sessions.    Assessment     Pt tolerated session well today. Mild reduction in muscle tension and tenderness to palpation compared to initial evaluation. Incorporated nerve glides to improve neural mobility; no adverse symptoms noted. Incorporated bridges and clamshells to improve hip strength and endurance; no adverse symptoms noted.      Corie is progressing well towards her goals.   Patient prognosis is Excellent.     Patient will continue to benefit from skilled outpatient physical therapy to address the deficits listed in the problem list box on initial evaluation, provide pt/family education and to maximize patient's level of independence in the home and community environment.     Patient's spiritual, cultural and educational needs considered and pt agreeable to plan of care and goals.     Anticipated Barriers for therapy: none     Short Term Goals:  4 weeks Status  Date Met   PAIN: Pt will report worst pain of 5/10 in order to progress toward max functional ability and improve quality of life. [x] Progressing  [] Met  [] Not Met     FUNCTION: Patient will demonstrate improved function as indicated by a score of greater than or equal to 61 out of 100 on FOTO. [x] Progressing  [] Met  [] Not Met     MOBILITY: Patient will improve AROM to 50% of stated goals, listed in objective measures above, in order to progress towards independence with functional activities.  [x] Progressing  [] Met  [] Not Met     STRENGTH: Patient will improve strength to 50% of stated goals, listed in objective measures above, in order to progress towards independence with functional activities. [x] Progressing  [] Met  [] Not Met     HEP: Patient will demonstrate independence with HEP in order to progress toward functional independence. [x] Progressing  [] Met  [] Not Met         Long Term Goals:  8 weeks Status Date Met   PAIN: Pt will report worst pain of 2/10 in order to progress toward max functional ability and improve quality of life [x] Progressing  [] Met  [] Not Met     FUNCTION: Patient will demonstrate improved function as indicated by a score of greater than or equal to 70 out of 100 on FOTO. [x] Progressing  [] Met  [] Not Met     MOBILITY: Patient will improve AROM to stated goals, listed in objective measures above, in order to return to maximal functional potential and improve quality of life.  [x] Progressing  [] Met  [] Not Met     STRENGTH: Patient will improve strength to stated goals, listed in objective measures above, in order to improve functional independence and quality of life.  [x] Progressing  [] Met  [] Not Met     GAIT: Patient will demonstrate normalized gait mechanics with minimal compensation in order to return to PLOF. [x] Progressing  [] Met  [] Not Met     Patient will return to normal ADL's, IADL's, community involvement, recreational activities, and work-related activities with less than or equal to 2/10 pain and maximal function.  [x] Progressing  [] Met  [] Not Met        Plan     Continue Plan of Care (POC) and progress per patient tolerance. See treatment section for details on planned progressions next session.      Yanni Oh, PT

## 2024-08-14 ENCOUNTER — PATIENT MESSAGE (OUTPATIENT)
Dept: REHABILITATION | Facility: HOSPITAL | Age: 54
End: 2024-08-14

## 2024-08-14 ENCOUNTER — CLINICAL SUPPORT (OUTPATIENT)
Dept: REHABILITATION | Facility: HOSPITAL | Age: 54
End: 2024-08-14
Payer: COMMERCIAL

## 2024-08-14 DIAGNOSIS — Z74.09 DECREASED FUNCTIONAL MOBILITY AND ENDURANCE: Primary | ICD-10-CM

## 2024-08-14 DIAGNOSIS — M62.81 PROXIMAL MUSCLE WEAKNESS: ICD-10-CM

## 2024-08-14 PROCEDURE — 97530 THERAPEUTIC ACTIVITIES: CPT

## 2024-08-14 PROCEDURE — 97140 MANUAL THERAPY 1/> REGIONS: CPT

## 2024-08-14 PROCEDURE — 97112 NEUROMUSCULAR REEDUCATION: CPT

## 2024-08-14 PROCEDURE — 97110 THERAPEUTIC EXERCISES: CPT

## 2024-08-15 PROBLEM — Z74.09 DECREASED FUNCTIONAL MOBILITY AND ENDURANCE: Status: RESOLVED | Noted: 2023-03-15 | Resolved: 2024-08-15

## 2024-08-15 PROBLEM — M62.81 PROXIMAL MUSCLE WEAKNESS: Status: RESOLVED | Noted: 2023-03-15 | Resolved: 2024-08-15

## 2024-08-15 NOTE — PROGRESS NOTES
[facility]   Physical Therapy Treatment Note        Name: Corie Carreno  Clinic Number: [id number]    Therapy Diagnosis:   Encounter Diagnoses   Name Primary?    Decreased functional mobility and endurance Yes    Proximal muscle weakness      Physician: Juan Miguel Aj MD    Visit Date: 2024    Physician Orders: PT Eval and Treat  Medical Diagnosis from Referral: Sciatica, unspecified laterality [M54.30]   Evaluation Date: 2024  Authorization Period Expiration: 2025   Plan of Care Expiration: 10/7/2024  Progress Note Due: 2024  Visit # / Visits authorized:  (+1 for evaluation)   FOTO: 1/3 (last performed on 2024)     Precautions: Standard     Time In: 1100  Time Out: 1201  Total Billable Time: 58 minutes (Billing reflects 1 on  treatment time spent with patient)    Subjective     Patient reports: She felt great following her previous session but notes increased muscle tension and spasms the following day     He/She was compliant with home exercise program.  Response to previous treatment: significant decrease in pain for ~ 1 day   Functional change: no significant changes noted     Pain: NT/10     Location: R hip to posterior thigh     Objective      Objective Measures updated at progress report or POC update only unless otherwise noted.       Treatment     Corie received the treatments listed below:       CPT Intervention Performed   Today Duration / Intensity   MT Soft tissue mobilization  x R glutes and piriformis    TE Recumbent bike  x Level 2 for 6 mins      Hamstring stretch - strap x 1 min      Single knee to chest -strap x 30s      Piriformis stretch   Figure 4 push and pull x 2 mins x 10s holds each    NMR Posterior pelvic tilt  x 20x      Clamshells  x 3x10 b      Single leg stance  x 3x30s b      Sciatic nerve glides - slump position x 2 mins x 5s holds each direction   TA Bridges  x 3x10      Leg press  x  x Double le# 3x10   Single le#  3x10      Knee extensions  x Double le# 3x10      Hamstring curls  x Double le# 3x10      Lateral squat walks x Red band 4 laps along mirror                                  PLAN               CPT Codes available for Billing:   (08) minutes of Manual therapy (MT) to improve pain and ROM.  (12) minutes of Therapeutic Exercise (TE) to develop strength, endurance, range of motion, and flexibility.  (15) minutes of Neuromuscular Re-Education (NMR)  to improve: Balance, Coordination, Kinesthetic, Sense, Proprioception, and Posture.  (23) minutes of Therapeutic Activities (TA) to improve functional performance.  Vasopneumatic Device Therapy () for management of swelling/edema. (04883)  Unattended Electrical Stimulation (ES) for muscle performance or pain modulation.  BFR: Blood flow restriction applied during exercise    Patient Education and Home Exercises       Home Exercises Provided and Patient Education Provided     Education provided: (time included with treatment) minutes  PURPOSE: Patient educated on the impairments noted above and the effects of physical therapy intervention to improve overall condition and QOL.   EXERCISE: Patient was educated on all the above exercise prior/during/after for proper posture, positioning, and execution for safe performance with home exercise program.   STRENGTH: Patient educated on the importance of improved core and extremity strength in order to improve alignment of the spine and extremities with static positions and dynamic movement.   GAIT & BALANCE: Patient educated on the importance of strong core and lower extremity musculature in order to improve both static and dynamic balance, improve gait mechanics, reduce fall risk and improve household and community mobility.     Written Home Exercises Provided: yes.  Exercises were reviewed and Corie was able to demonstrate them prior to the end of the session.  Corie demonstrated good  understanding of the education  provided. See EMR under Patient Instructions for exercises provided during therapy sessions.    Assessment     Pt tolerated session well today. Continued previously performed interventions due to adverse symptoms noted following previous session. Performed manual interventions at end of session to reduce muscle tension following exercise. Pt notes significant relief of pain and muscle tension following interventions     Corie is progressing well towards her goals.   Patient prognosis is Excellent.     Patient will continue to benefit from skilled outpatient physical therapy to address the deficits listed in the problem list box on initial evaluation, provide pt/family education and to maximize patient's level of independence in the home and community environment.     Patient's spiritual, cultural and educational needs considered and pt agreeable to plan of care and goals.     Anticipated Barriers for therapy: none     Short Term Goals:  4 weeks Status  Date Met   PAIN: Pt will report worst pain of 5/10 in order to progress toward max functional ability and improve quality of life. [x] Progressing  [] Met  [] Not Met     FUNCTION: Patient will demonstrate improved function as indicated by a score of greater than or equal to 61 out of 100 on FOTO. [x] Progressing  [] Met  [] Not Met     MOBILITY: Patient will improve AROM to 50% of stated goals, listed in objective measures above, in order to progress towards independence with functional activities.  [x] Progressing  [] Met  [] Not Met     STRENGTH: Patient will improve strength to 50% of stated goals, listed in objective measures above, in order to progress towards independence with functional activities. [x] Progressing  [] Met  [] Not Met     HEP: Patient will demonstrate independence with HEP in order to progress toward functional independence. [x] Progressing  [] Met  [] Not Met        Long Term Goals:  8 weeks Status Date Met   PAIN: Pt will report worst pain of  2/10 in order to progress toward max functional ability and improve quality of life [x] Progressing  [] Met  [] Not Met     FUNCTION: Patient will demonstrate improved function as indicated by a score of greater than or equal to 70 out of 100 on FOTO. [x] Progressing  [] Met  [] Not Met     MOBILITY: Patient will improve AROM to stated goals, listed in objective measures above, in order to return to maximal functional potential and improve quality of life.  [x] Progressing  [] Met  [] Not Met     STRENGTH: Patient will improve strength to stated goals, listed in objective measures above, in order to improve functional independence and quality of life.  [x] Progressing  [] Met  [] Not Met     GAIT: Patient will demonstrate normalized gait mechanics with minimal compensation in order to return to PLOF. [x] Progressing  [] Met  [] Not Met     Patient will return to normal ADL's, IADL's, community involvement, recreational activities, and work-related activities with less than or equal to 2/10 pain and maximal function.  [x] Progressing  [] Met  [] Not Met        Plan     Continue Plan of Care (POC) and progress per patient tolerance. See treatment section for details on planned progressions next session.      Yanni Oh, PT

## 2024-08-19 ENCOUNTER — CLINICAL SUPPORT (OUTPATIENT)
Dept: REHABILITATION | Facility: HOSPITAL | Age: 54
End: 2024-08-19
Payer: COMMERCIAL

## 2024-08-19 DIAGNOSIS — Z74.09 DECREASED FUNCTIONAL MOBILITY AND ENDURANCE: Primary | ICD-10-CM

## 2024-08-19 DIAGNOSIS — M62.81 PROXIMAL MUSCLE WEAKNESS: ICD-10-CM

## 2024-08-19 PROCEDURE — 97140 MANUAL THERAPY 1/> REGIONS: CPT

## 2024-08-19 PROCEDURE — 97530 THERAPEUTIC ACTIVITIES: CPT

## 2024-08-19 PROCEDURE — 97110 THERAPEUTIC EXERCISES: CPT

## 2024-08-19 PROCEDURE — 97112 NEUROMUSCULAR REEDUCATION: CPT

## 2024-08-19 NOTE — PROGRESS NOTES
[facility]   Physical Therapy Treatment Note        Name: Corie Carreno  Clinic Number: [id number]    Therapy Diagnosis:   Encounter Diagnoses   Name Primary?    Decreased functional mobility and endurance Yes    Proximal muscle weakness        Physician: Juan Miguel Aj MD    Visit Date: 8/19/2024    Physician Orders: PT Eval and Treat  Medical Diagnosis from Referral: Sciatica, unspecified laterality [M54.30]   Evaluation Date: 8/7/2024  Authorization Period Expiration: 8/7/2025   Plan of Care Expiration: 10/7/2024  Progress Note Due: 9/7/2024  Visit # / Visits authorized: 3/12 (+1 for evaluation)   FOTO: 1/3 (last performed on 8/7/2024)     Precautions: Standard     Time In: 0930  Time Out: 1029  Total Billable Time: 56 minutes (Billing reflects 1 on 1 treatment time spent with patient)    Subjective     Patient reports: She is feeling okay today. Notes that she got sore again following her last session but that she had significantly less discomfort than the session prior.     He/She was compliant with home exercise program.  Response to previous treatment: significant decrease in pain for ~ 1 day   Functional change: no significant changes noted     Pain: NT/10     Location: R hip to posterior thigh     Objective      Objective Measures updated at progress report or POC update only unless otherwise noted.       Treatment     Corie received the treatments listed below:     CPT Intervention Performed   Today Duration / Intensity   MT Soft tissue mobilization  x R glutes and piriformis    TE Recumbent bike  x Level 2 for 6 mins      Hamstring stretch - strap   1 min      Single knee to chest -strap   30s      Piriformis stretch   Figure 4 push and pull   2 mins x 10s holds each      Gastrocnemius stretch x 3x30s    NMR Posterior pelvic tilt  x 20x      Straight leg raise abduction  x 2x10 b      Clamshells  x 1x10 b      Seated hip IR  x Red band 3x10      Single leg stance ball pass  x  2# ball, 2x10 b      Sciatic nerve - slump position x Glides: 2 mins x 5s holds each direction  Tensioners: 2 mins x 5s holds each direction   TA Bridges  x 3x12     Leg press  x  x Double le# 3x10   Single le# 1x10, 65# 2x10       Knee extensions    Double le# 3x10      Hamstring curls    Double le# 3x10      Lateral squat walks x Red band 4 laps along mirror      Box squats  x 20 inch 3x10                        PLAN               CPT Codes available for Billing:   (10) minutes of Manual therapy (MT) to improve pain and ROM.  (08) minutes of Therapeutic Exercise (TE) to develop strength, endurance, range of motion, and flexibility.  (16) minutes of Neuromuscular Re-Education (NMR)  to improve: Balance, Coordination, Kinesthetic, Sense, Proprioception, and Posture.  (32) minutes of Therapeutic Activities (TA) to improve functional performance.  Vasopneumatic Device Therapy () for management of swelling/edema. (42927)  Unattended Electrical Stimulation (ES) for muscle performance or pain modulation.  BFR: Blood flow restriction applied during exercise    Patient Education and Home Exercises       Home Exercises Provided and Patient Education Provided     Education provided: (time included with treatment) minutes  PURPOSE: Patient educated on the impairments noted above and the effects of physical therapy intervention to improve overall condition and QOL.   EXERCISE: Patient was educated on all the above exercise prior/during/after for proper posture, positioning, and execution for safe performance with home exercise program.   STRENGTH: Patient educated on the importance of improved core and extremity strength in order to improve alignment of the spine and extremities with static positions and dynamic movement.   GAIT & BALANCE: Patient educated on the importance of strong core and lower extremity musculature in order to improve both static and dynamic balance, improve gait mechanics, reduce  fall risk and improve household and community mobility.     Written Home Exercises Provided: yes.  Exercises were reviewed and Corie was able to demonstrate them prior to the end of the session.  Corie demonstrated good  understanding of the education provided. See EMR under Patient Instructions for exercises provided during therapy sessions.    Assessment     Pt tolerated session well today. Pt was asymptomatic with nerve glides. Progressed to nerve tensioners to further progress neural mobility; pt instructed to limit ROM to be pain free; no adverse symptoms noted with interventions.  Added box squats to improve functional strength; pt notes significant fatigue following session but reports minimal pain.     Corie is progressing well towards her goals.   Patient prognosis is Excellent.     Patient will continue to benefit from skilled outpatient physical therapy to address the deficits listed in the problem list box on initial evaluation, provide pt/family education and to maximize patient's level of independence in the home and community environment.     Patient's spiritual, cultural and educational needs considered and pt agreeable to plan of care and goals.     Anticipated Barriers for therapy: none     Short Term Goals:  4 weeks Status  Date Met   PAIN: Pt will report worst pain of 5/10 in order to progress toward max functional ability and improve quality of life. [x] Progressing  [] Met  [] Not Met     FUNCTION: Patient will demonstrate improved function as indicated by a score of greater than or equal to 61 out of 100 on FOTO. [x] Progressing  [] Met  [] Not Met     MOBILITY: Patient will improve AROM to 50% of stated goals, listed in objective measures above, in order to progress towards independence with functional activities.  [x] Progressing  [] Met  [] Not Met     STRENGTH: Patient will improve strength to 50% of stated goals, listed in objective measures above, in order to progress towards  independence with functional activities. [x] Progressing  [] Met  [] Not Met     HEP: Patient will demonstrate independence with HEP in order to progress toward functional independence. [x] Progressing  [] Met  [] Not Met        Long Term Goals:  8 weeks Status Date Met   PAIN: Pt will report worst pain of 2/10 in order to progress toward max functional ability and improve quality of life [x] Progressing  [] Met  [] Not Met     FUNCTION: Patient will demonstrate improved function as indicated by a score of greater than or equal to 70 out of 100 on FOTO. [x] Progressing  [] Met  [] Not Met     MOBILITY: Patient will improve AROM to stated goals, listed in objective measures above, in order to return to maximal functional potential and improve quality of life.  [x] Progressing  [] Met  [] Not Met     STRENGTH: Patient will improve strength to stated goals, listed in objective measures above, in order to improve functional independence and quality of life.  [x] Progressing  [] Met  [] Not Met     GAIT: Patient will demonstrate normalized gait mechanics with minimal compensation in order to return to PLOF. [x] Progressing  [] Met  [] Not Met     Patient will return to normal ADL's, IADL's, community involvement, recreational activities, and work-related activities with less than or equal to 2/10 pain and maximal function.  [x] Progressing  [] Met  [] Not Met        Plan     Continue Plan of Care (POC) and progress per patient tolerance. See treatment section for details on planned progressions next session.      Yanni Oh, PT

## 2024-08-21 ENCOUNTER — CLINICAL SUPPORT (OUTPATIENT)
Dept: REHABILITATION | Facility: HOSPITAL | Age: 54
End: 2024-08-21
Payer: COMMERCIAL

## 2024-08-21 DIAGNOSIS — Z74.09 DECREASED FUNCTIONAL MOBILITY AND ENDURANCE: Primary | ICD-10-CM

## 2024-08-21 DIAGNOSIS — M62.81 PROXIMAL MUSCLE WEAKNESS: ICD-10-CM

## 2024-08-21 PROCEDURE — 97112 NEUROMUSCULAR REEDUCATION: CPT

## 2024-08-21 PROCEDURE — 97110 THERAPEUTIC EXERCISES: CPT

## 2024-08-21 PROCEDURE — 97140 MANUAL THERAPY 1/> REGIONS: CPT

## 2024-08-21 PROCEDURE — 97530 THERAPEUTIC ACTIVITIES: CPT

## 2024-08-23 NOTE — PROGRESS NOTES
[facility]   Physical Therapy Treatment Note        Name: Corie Carreno  Clinic Number: [id number]    Therapy Diagnosis:   Encounter Diagnoses   Name Primary?    Decreased functional mobility and endurance Yes    Proximal muscle weakness        Physician: Juan Miguel Aj MD    Visit Date: 8/21/2024    Physician Orders: PT Eval and Treat  Medical Diagnosis from Referral: Sciatica, unspecified laterality [M54.30]   Evaluation Date: 8/7/2024  Authorization Period Expiration: 8/7/2025   Plan of Care Expiration: 10/7/2024  Progress Note Due: 9/7/2024  Visit # / Visits authorized: 3/12 (+1 for evaluation)   FOTO: 1/3 (last performed on 8/7/2024)     Precautions: Standard     Time In: 1000  Time Out: 1102  Total Billable Time: 58 minutes (Billing reflects 1 on 1 treatment time spent with patient)    Subjective     Patient reports: Her muscles are very sore following the progressions made last session. States that she did not have as much hip pain following last session but did notice muscle spasms throughout her leg    He/She was compliant with home exercise program.  Response to previous treatment: significant decrease in pain for ~ 1 day   Functional change: no significant changes noted     Pain: NT/10     Location: R hip to posterior thigh     Objective      Objective Measures updated at progress report or POC update only unless otherwise noted.       Treatment     Corie received the treatments listed below:     CPT Intervention Performed   Today Duration / Intensity   MT Soft tissue mobilization  x R glutes and piriformis    TE Recumbent bike  x Level 2 for 6 mins      Hamstring stretch - strap   1 min      Single knee to chest -strap   30s      Piriformis stretch   Figure 4 push and pull   2 mins x 10s holds each      Gastrocnemius stretch x 2x30s      Soleus stretch  x 2x30s b    NMR Posterior pelvic tilt  x 20x      Straight leg raise flexion  x 2x10 b      Straight leg raise abduction  x  2x10 b      Clamshells  x 1x10 b      Seated hip IR  x Red band 3x10      Single leg stance ball pass  x 2# ball, 2x10 b      Sciatic nerve - slump position x Glides: 2 mins x 5s holds each direction  Tensioners: 2 mins x 5s holds each direction   TA Bridges  x 2x10 with 5s holds      Leg press  x  x Double le# 3x10   Single le# 1x10, 65# 2x10       Knee extensions    Double le# 3x10      Hamstring curls    Double le# 3x10      Lateral squat walks x Red band 4 laps along mirror      Box squats    20 inch 3x10                        PLAN               CPT Codes available for Billing:   (10) minutes of Manual therapy (MT) to improve pain and ROM.  (08) minutes of Therapeutic Exercise (TE) to develop strength, endurance, range of motion, and flexibility.  (25) minutes of Neuromuscular Re-Education (NMR)  to improve: Balance, Coordination, Kinesthetic, Sense, Proprioception, and Posture.  (15) minutes of Therapeutic Activities (TA) to improve functional performance.  Vasopneumatic Device Therapy () for management of swelling/edema. (86702)  Unattended Electrical Stimulation (ES) for muscle performance or pain modulation.  BFR: Blood flow restriction applied during exercise    Patient Education and Home Exercises       Home Exercises Provided and Patient Education Provided     Education provided: (time included with treatment) minutes  PURPOSE: Patient educated on the impairments noted above and the effects of physical therapy intervention to improve overall condition and QOL.   EXERCISE: Patient was educated on all the above exercise prior/during/after for proper posture, positioning, and execution for safe performance with home exercise program.   STRENGTH: Patient educated on the importance of improved core and extremity strength in order to improve alignment of the spine and extremities with static positions and dynamic movement.   GAIT & BALANCE: Patient educated on the importance of strong  core and lower extremity musculature in order to improve both static and dynamic balance, improve gait mechanics, reduce fall risk and improve household and community mobility.     Written Home Exercises Provided: yes.  Exercises were reviewed and Corie was able to demonstrate them prior to the end of the session.  Corie demonstrated good  understanding of the education provided. See EMR under Patient Instructions for exercises provided during therapy sessions.    Assessment     Pt tolerated session well today. Deferred box squats due to significant quad soreness reported by pt following previous session. Progressed hold time with bridges to improve posterior chain strength and endurance. Significant decreased neural tension noted with sciatic nerve glides and tensioners compared to previous session.     Corie is progressing well towards her goals.   Patient prognosis is Excellent.     Patient will continue to benefit from skilled outpatient physical therapy to address the deficits listed in the problem list box on initial evaluation, provide pt/family education and to maximize patient's level of independence in the home and community environment.     Patient's spiritual, cultural and educational needs considered and pt agreeable to plan of care and goals.     Anticipated Barriers for therapy: none     Short Term Goals:  4 weeks Status  Date Met   PAIN: Pt will report worst pain of 5/10 in order to progress toward max functional ability and improve quality of life. [x] Progressing  [] Met  [] Not Met     FUNCTION: Patient will demonstrate improved function as indicated by a score of greater than or equal to 61 out of 100 on FOTO. [x] Progressing  [] Met  [] Not Met     MOBILITY: Patient will improve AROM to 50% of stated goals, listed in objective measures above, in order to progress towards independence with functional activities.  [x] Progressing  [] Met  [] Not Met     STRENGTH: Patient will improve strength to  50% of stated goals, listed in objective measures above, in order to progress towards independence with functional activities. [x] Progressing  [] Met  [] Not Met     HEP: Patient will demonstrate independence with HEP in order to progress toward functional independence. [x] Progressing  [] Met  [] Not Met        Long Term Goals:  8 weeks Status Date Met   PAIN: Pt will report worst pain of 2/10 in order to progress toward max functional ability and improve quality of life [x] Progressing  [] Met  [] Not Met     FUNCTION: Patient will demonstrate improved function as indicated by a score of greater than or equal to 70 out of 100 on FOTO. [x] Progressing  [] Met  [] Not Met     MOBILITY: Patient will improve AROM to stated goals, listed in objective measures above, in order to return to maximal functional potential and improve quality of life.  [x] Progressing  [] Met  [] Not Met     STRENGTH: Patient will improve strength to stated goals, listed in objective measures above, in order to improve functional independence and quality of life.  [x] Progressing  [] Met  [] Not Met     GAIT: Patient will demonstrate normalized gait mechanics with minimal compensation in order to return to PLOF. [x] Progressing  [] Met  [] Not Met     Patient will return to normal ADL's, IADL's, community involvement, recreational activities, and work-related activities with less than or equal to 2/10 pain and maximal function.  [x] Progressing  [] Met  [] Not Met        Plan     Continue Plan of Care (POC) and progress per patient tolerance. See treatment section for details on planned progressions next session.      Yanni Oh, PT

## 2024-08-28 ENCOUNTER — CLINICAL SUPPORT (OUTPATIENT)
Dept: REHABILITATION | Facility: HOSPITAL | Age: 54
End: 2024-08-28
Payer: COMMERCIAL

## 2024-08-28 DIAGNOSIS — M62.81 PROXIMAL MUSCLE WEAKNESS: ICD-10-CM

## 2024-08-28 DIAGNOSIS — Z74.09 DECREASED FUNCTIONAL MOBILITY AND ENDURANCE: Primary | ICD-10-CM

## 2024-08-28 PROCEDURE — 97530 THERAPEUTIC ACTIVITIES: CPT

## 2024-08-28 PROCEDURE — 97110 THERAPEUTIC EXERCISES: CPT

## 2024-08-28 PROCEDURE — 97112 NEUROMUSCULAR REEDUCATION: CPT

## 2024-08-28 NOTE — PROGRESS NOTES
[facility]   Physical Therapy Treatment Note        Name: Corie Carreno  Clinic Number: [id number]    Therapy Diagnosis:   Encounter Diagnoses   Name Primary?    Decreased functional mobility and endurance Yes    Proximal muscle weakness        Physician: Juan Miguel Aj MD    Visit Date: 8/28/2024    Physician Orders: PT Eval and Treat  Medical Diagnosis from Referral: Sciatica, unspecified laterality [M54.30]   Evaluation Date: 8/7/2024  Authorization Period Expiration: 8/7/2025   Plan of Care Expiration: 10/7/2024  Progress Note Due: 9/7/2024  Visit # / Visits authorized: 3/12 (+1 for evaluation)   FOTO: 1/3 (last performed on 8/7/2024)     Precautions: Standard     Time In: 1000  Time Out: 1103  Total Billable Time: 56 minutes (Billing reflects 1 on 1 treatment time spent with patient)    Subjective     Patient reports: She is feeling a little better and has noticed decreased overall symptoms in the hip.     He/She was compliant with home exercise program.  Response to previous treatment: significant decrease in pain for ~ 1 day   Functional change: no significant changes noted     Pain: NT/10     Location: R hip to posterior thigh     Objective      Objective Measures updated at progress report or POC update only unless otherwise noted.       Treatment     Corie received the treatments listed below:     CPT Intervention Performed   Today Duration / Intensity   MT Soft tissue mobilization    R glutes and piriformis    TE Recumbent bike  x Level 3 for 7 mins      Hamstring stretch - strap   1 min      Single knee to chest -strap   30s      Piriformis stretch   Figure 4 push and pull   2 mins x 10s holds each      Gastrocnemius stretch x 2x30s      Soleus stretch  x 2x30s b    NMR Posterior pelvic tilt    20x      Straight leg raise flexion    2x10 b      Straight leg raise abduction  x 2x10 b      Clamshells  x 1x10 b      Seated hip IR    Red band 3x10      Single leg stance ball pass     2# ball, 2x10 b      Sciatic nerve - slump position   Glides: 2 mins x 5s holds each direction  Tensioners: 2 mins x 5s holds each direction     Steamboats  x 2x10 b    TA Bridges  x 3 mins  with 5s holds      Leg press - seat ankle 5  x  x Double le# 3x10   Single le# 3x10  b      Knee extensions  x Double le# 3x12     Hamstring curls  x Double le# 3x12     Lateral squat walks x Red band 4 laps along mirror      Box squats  x 24 inch 1x10 then discontinued                        PLAN Step ups              CPT Codes available for Billing:   (00) minutes of Manual therapy (MT) to improve pain and ROM.  (10) minutes of Therapeutic Exercise (TE) to develop strength, endurance, range of motion, and flexibility.  (16) minutes of Neuromuscular Re-Education (NMR)  to improve: Balance, Coordination, Kinesthetic, Sense, Proprioception, and Posture.  (30) minutes of Therapeutic Activities (TA) to improve functional performance.  Vasopneumatic Device Therapy () for management of swelling/edema. (93864)  Unattended Electrical Stimulation (ES) for muscle performance or pain modulation.  BFR: Blood flow restriction applied during exercise    Patient Education and Home Exercises       Home Exercises Provided and Patient Education Provided     Education provided: (time included with treatment) minutes  PURPOSE: Patient educated on the impairments noted above and the effects of physical therapy intervention to improve overall condition and QOL.   EXERCISE: Patient was educated on all the above exercise prior/during/after for proper posture, positioning, and execution for safe performance with home exercise program.   STRENGTH: Patient educated on the importance of improved core and extremity strength in order to improve alignment of the spine and extremities with static positions and dynamic movement.   GAIT & BALANCE: Patient educated on the importance of strong core and lower extremity musculature in order  to improve both static and dynamic balance, improve gait mechanics, reduce fall risk and improve household and community mobility.     Written Home Exercises Provided: yes.  Exercises were reviewed and Corie was able to demonstrate them prior to the end of the session.  Corie demonstrated good  understanding of the education provided. See EMR under Patient Instructions for exercises provided during therapy sessions.    Assessment     Pt tolerated session well today. Continues to report significant fatigue in the hips with straight leg raise abduction and is only able to perform two sets before having to regress to clamshells. Attempted squats to box today; however, intervention was discontinued due to pt report of increased knee pain. Added steamboats in order to improve single leg stability and endurance; pt demonstrates good form but reports significant increased hip fatigue with interventions.     Corie is progressing well towards her goals.   Patient prognosis is Excellent.     Patient will continue to benefit from skilled outpatient physical therapy to address the deficits listed in the problem list box on initial evaluation, provide pt/family education and to maximize patient's level of independence in the home and community environment.     Patient's spiritual, cultural and educational needs considered and pt agreeable to plan of care and goals.     Anticipated Barriers for therapy: none     Short Term Goals:  4 weeks Status  Date Met   PAIN: Pt will report worst pain of 5/10 in order to progress toward max functional ability and improve quality of life. [x] Progressing  [] Met  [] Not Met     FUNCTION: Patient will demonstrate improved function as indicated by a score of greater than or equal to 61 out of 100 on FOTO. [x] Progressing  [] Met  [] Not Met     MOBILITY: Patient will improve AROM to 50% of stated goals, listed in objective measures above, in order to progress towards independence with functional  activities.  [x] Progressing  [] Met  [] Not Met     STRENGTH: Patient will improve strength to 50% of stated goals, listed in objective measures above, in order to progress towards independence with functional activities. [x] Progressing  [] Met  [] Not Met     HEP: Patient will demonstrate independence with HEP in order to progress toward functional independence. [x] Progressing  [] Met  [] Not Met        Long Term Goals:  8 weeks Status Date Met   PAIN: Pt will report worst pain of 2/10 in order to progress toward max functional ability and improve quality of life [x] Progressing  [] Met  [] Not Met     FUNCTION: Patient will demonstrate improved function as indicated by a score of greater than or equal to 70 out of 100 on FOTO. [x] Progressing  [] Met  [] Not Met     MOBILITY: Patient will improve AROM to stated goals, listed in objective measures above, in order to return to maximal functional potential and improve quality of life.  [x] Progressing  [] Met  [] Not Met     STRENGTH: Patient will improve strength to stated goals, listed in objective measures above, in order to improve functional independence and quality of life.  [x] Progressing  [] Met  [] Not Met     GAIT: Patient will demonstrate normalized gait mechanics with minimal compensation in order to return to PLOF. [x] Progressing  [] Met  [] Not Met     Patient will return to normal ADL's, IADL's, community involvement, recreational activities, and work-related activities with less than or equal to 2/10 pain and maximal function.  [x] Progressing  [] Met  [] Not Met        Plan     Continue Plan of Care (POC) and progress per patient tolerance. See treatment section for details on planned progressions next session.      Yanni Oh, PT

## 2024-09-03 ENCOUNTER — CLINICAL SUPPORT (OUTPATIENT)
Dept: REHABILITATION | Facility: HOSPITAL | Age: 54
End: 2024-09-03
Payer: COMMERCIAL

## 2024-09-03 DIAGNOSIS — Z74.09 DECREASED FUNCTIONAL MOBILITY AND ENDURANCE: Primary | ICD-10-CM

## 2024-09-03 DIAGNOSIS — M62.81 PROXIMAL MUSCLE WEAKNESS: ICD-10-CM

## 2024-09-03 PROCEDURE — 97112 NEUROMUSCULAR REEDUCATION: CPT

## 2024-09-03 PROCEDURE — 97110 THERAPEUTIC EXERCISES: CPT

## 2024-09-03 PROCEDURE — 97530 THERAPEUTIC ACTIVITIES: CPT

## 2024-09-03 PROCEDURE — 97140 MANUAL THERAPY 1/> REGIONS: CPT

## 2024-09-03 NOTE — PROGRESS NOTES
[facility]   Physical Therapy Treatment Note        Name: Corie Carreno  Clinic Number: [id number]    Therapy Diagnosis:   Encounter Diagnoses   Name Primary?    Decreased functional mobility and endurance Yes    Proximal muscle weakness        Physician: Juan Miguel Aj MD    Visit Date: 9/3/2024    Physician Orders: PT Eval and Treat  Medical Diagnosis from Referral: Sciatica, unspecified laterality [M54.30]   Evaluation Date: 8/7/2024  Authorization Period Expiration: 8/7/2025   Plan of Care Expiration: 10/7/2024  Progress Note Due: 9/7/2024  Visit # / Visits authorized: 6/12 (+1 for evaluation)   FOTO: 1/3 (last performed on 8/7/2024)     Precautions: Standard     Time In: 1100  Time Out: 1159  Total Billable Time: 54 minutes (Billing reflects 1 on 1 treatment time spent with patient)    Subjective     Patient reports: Her knees and hip were flared up following her previous session.     He/She was compliant with home exercise program.  Response to previous treatment: significant decrease in pain for ~ 1 day   Functional change: no significant changes noted     Pain: NT/10     Location: R hip to posterior thigh     Objective      Objective Measures updated at progress report or POC update only unless otherwise noted.       Treatment     Corie received the treatments listed below:     CPT Intervention Performed   Today Duration / Intensity   MT Soft tissue mobilization  x R glutes and piriformis    TE Recumbent bike  x Level 3 for 7 mins      Hamstring stretch - strap   1 min      Single knee to chest -strap   30s      Piriformis stretch   Figure 4 push and pull   2 mins x 10s holds each      Gastrocnemius stretch x 2x30s      Soleus stretch  x 2x30s b    NMR Posterior pelvic tilt    20x      Straight leg raise flexion    2x10 b      Straight leg raise abduction  x 2x10 b. 1x5 b      Clamshells  x 1x5 b      Seated hip IR    Red band 3x10      Single leg stance ball pass    2# ball, 2x10  b      Sciatic nerve - slump position X  x seated: 2 mins x 5s holds each direction  Supine: 3x20     Steamboats    2x10 b    TA Bridges  x 3 mins  with 5s holds      Leg press - seat ankle 5  x  x Double le# 3x10   Single le# 3x10  b      Knee extensions  x Double le# 3x12     Hamstring curls  x Double le# 3x12     Lateral squat walks   Red band 4 laps along mirror      Box squats    24 inch 1x10 then discontinued                        PLAN Step ups              CPT Codes available for Billing:   (10) minutes of Manual therapy (MT) to improve pain and ROM.  (10) minutes of Therapeutic Exercise (TE) to develop strength, endurance, range of motion, and flexibility.  (12) minutes of Neuromuscular Re-Education (NMR)  to improve: Balance, Coordination, Kinesthetic, Sense, Proprioception, and Posture.  (22) minutes of Therapeutic Activities (TA) to improve functional performance.  Vasopneumatic Device Therapy () for management of swelling/edema. (73305)  Unattended Electrical Stimulation (ES) for muscle performance or pain modulation.  BFR: Blood flow restriction applied during exercise    Patient Education and Home Exercises       Home Exercises Provided and Patient Education Provided     Education provided: (time included with treatment) minutes  PURPOSE: Patient educated on the impairments noted above and the effects of physical therapy intervention to improve overall condition and QOL.   EXERCISE: Patient was educated on all the above exercise prior/during/after for proper posture, positioning, and execution for safe performance with home exercise program.   STRENGTH: Patient educated on the importance of improved core and extremity strength in order to improve alignment of the spine and extremities with static positions and dynamic movement.   GAIT & BALANCE: Patient educated on the importance of strong core and lower extremity musculature in order to improve both static and dynamic balance,  improve gait mechanics, reduce fall risk and improve household and community mobility.     Written Home Exercises Provided: yes.  Exercises were reviewed and Corie was able to demonstrate them prior to the end of the session.  Corie demonstrated good  understanding of the education provided. See EMR under Patient Instructions for exercises provided during therapy sessions.    Assessment     Pt tolerated session well today. Discontinued sit to stands today in order to reduce knee pain. Pt was able to complete more reps of straight leg raise abduction today before regressing to clamshells; indicating improved lateral hip strength. Added supine sciatic nerve glides in order to further promote neural mobility.     Corie is progressing well towards her goals.   Patient prognosis is Excellent.     Patient will continue to benefit from skilled outpatient physical therapy to address the deficits listed in the problem list box on initial evaluation, provide pt/family education and to maximize patient's level of independence in the home and community environment.     Patient's spiritual, cultural and educational needs considered and pt agreeable to plan of care and goals.     Anticipated Barriers for therapy: none     Short Term Goals:  4 weeks Status  Date Met   PAIN: Pt will report worst pain of 5/10 in order to progress toward max functional ability and improve quality of life. [x] Progressing  [] Met  [] Not Met     FUNCTION: Patient will demonstrate improved function as indicated by a score of greater than or equal to 61 out of 100 on FOTO. [x] Progressing  [] Met  [] Not Met     MOBILITY: Patient will improve AROM to 50% of stated goals, listed in objective measures above, in order to progress towards independence with functional activities.  [x] Progressing  [] Met  [] Not Met     STRENGTH: Patient will improve strength to 50% of stated goals, listed in objective measures above, in order to progress towards  independence with functional activities. [x] Progressing  [] Met  [] Not Met     HEP: Patient will demonstrate independence with HEP in order to progress toward functional independence. [x] Progressing  [] Met  [] Not Met        Long Term Goals:  8 weeks Status Date Met   PAIN: Pt will report worst pain of 2/10 in order to progress toward max functional ability and improve quality of life [x] Progressing  [] Met  [] Not Met     FUNCTION: Patient will demonstrate improved function as indicated by a score of greater than or equal to 70 out of 100 on FOTO. [x] Progressing  [] Met  [] Not Met     MOBILITY: Patient will improve AROM to stated goals, listed in objective measures above, in order to return to maximal functional potential and improve quality of life.  [x] Progressing  [] Met  [] Not Met     STRENGTH: Patient will improve strength to stated goals, listed in objective measures above, in order to improve functional independence and quality of life.  [x] Progressing  [] Met  [] Not Met     GAIT: Patient will demonstrate normalized gait mechanics with minimal compensation in order to return to PLOF. [x] Progressing  [] Met  [] Not Met     Patient will return to normal ADL's, IADL's, community involvement, recreational activities, and work-related activities with less than or equal to 2/10 pain and maximal function.  [x] Progressing  [] Met  [x] Not Met        Plan     Continue Plan of Care (POC) and progress per patient tolerance. See treatment section for details on planned progressions next session.      Yanni Oh, PT

## 2024-09-05 ENCOUNTER — CLINICAL SUPPORT (OUTPATIENT)
Dept: REHABILITATION | Facility: HOSPITAL | Age: 54
End: 2024-09-05
Payer: COMMERCIAL

## 2024-09-05 DIAGNOSIS — Z74.09 DECREASED FUNCTIONAL MOBILITY AND ENDURANCE: Primary | ICD-10-CM

## 2024-09-05 DIAGNOSIS — M62.81 PROXIMAL MUSCLE WEAKNESS: ICD-10-CM

## 2024-09-05 PROCEDURE — 97110 THERAPEUTIC EXERCISES: CPT

## 2024-09-05 PROCEDURE — 97112 NEUROMUSCULAR REEDUCATION: CPT

## 2024-09-05 PROCEDURE — 97140 MANUAL THERAPY 1/> REGIONS: CPT

## 2024-09-06 NOTE — PROGRESS NOTES
[facility]   Physical Therapy Treatment Note        Name: Corie Carreno  Clinic Number: [id number]    Therapy Diagnosis:   Encounter Diagnoses   Name Primary?    Decreased functional mobility and endurance Yes    Proximal muscle weakness        Physician: Juan Miguel Aj MD    Visit Date: 9/5/2024    Physician Orders: PT Eval and Treat  Medical Diagnosis from Referral: Sciatica, unspecified laterality [M54.30]   Evaluation Date: 8/7/2024  Authorization Period Expiration: 8/7/2025   Plan of Care Expiration: 10/7/2024  Progress Note Due: 9/7/2024  Visit # / Visits authorized: 6/12 (+1 for evaluation)   FOTO: 1/3 (last performed on 8/7/2024)     Precautions: Standard     Time In: 1100  Time Out: 1203  Total Billable Time: 52 minutes (Billing reflects 1 on 1 treatment time spent with patient)    Subjective     Patient reports: She is feeling better than last session but continues to have pain and tension in both hips. Is frustrated because she felt like she was doing so good a few weeks ago    He/She was compliant with home exercise program.  Response to previous treatment: significant decrease in pain for ~ 1 day   Functional change: no significant changes noted     Pain: NT/10     Location: R hip to posterior thigh     Objective      Objective Measures updated at progress report or POC update only unless otherwise noted.       Treatment     Corie received the treatments listed below:     CPT Intervention Performed   Today Duration / Intensity   MT Soft tissue mobilization  x B glutes and piriformis    TE Recumbent bike  x Level 3 for 7 mins      Hamstring stretch - strap   1 min      Single knee to chest -strap   30s      Lower trunk rotations- figure 4  x 2 mins, performed b      Gastrocnemius stretch   2x30s      Soleus stretch    2x30s b    NMR Posterior pelvic tilt  x 2 mins x 5s holds     Abdominal bracing  x 3 mins x 5s holds, cueing to prevent breath holding      Straight leg raise  flexion    2x10 b      Straight leg raise abduction    2x10 b. 1x5 b      Clamshells  x 2 mins x 5s holds b      Seated hip IR    Red band 3x10      Single leg stance x 3x30s b      Sciatic nerve - slump position X    seated: 2 mins x 5s holds each direction, performed b   Supine: 3x20     Steamboats    2x10 b      Bridge + posterior pelvic tilt  x 3 mins  with 5s holds              TA Leg press - seat ankle 5       Double le# 3x10   Single le# 3x10  b      Knee extensions    Double le# 3x12     Hamstring curls    Double le# 3x12     Lateral squat walks   Red band 4 laps along mirror      Box squats    24 inch 1x10 then discontinued                        PLAN Step ups              CPT Codes available for Billing:   (15) minutes of Manual therapy (MT) to improve pain and ROM.  (12) minutes of Therapeutic Exercise (TE) to develop strength, endurance, range of motion, and flexibility.  (25) minutes of Neuromuscular Re-Education (NMR)  to improve: Balance, Coordination, Kinesthetic, Sense, Proprioception, and Posture.  (00) minutes of Therapeutic Activities (TA) to improve functional performance.  Vasopneumatic Device Therapy () for management of swelling/edema. (20982)  Unattended Electrical Stimulation (ES) for muscle performance or pain modulation.  BFR: Blood flow restriction applied during exercise    Patient Education and Home Exercises       Home Exercises Provided and Patient Education Provided     Education provided: (time included with treatment) minutes  PURPOSE: Patient educated on the impairments noted above and the effects of physical therapy intervention to improve overall condition and QOL.   EXERCISE: Patient was educated on all the above exercise prior/during/after for proper posture, positioning, and execution for safe performance with home exercise program.   STRENGTH: Patient educated on the importance of improved core and extremity strength in order to improve alignment of  the spine and extremities with static positions and dynamic movement.   GAIT & BALANCE: Patient educated on the importance of strong core and lower extremity musculature in order to improve both static and dynamic balance, improve gait mechanics, reduce fall risk and improve household and community mobility.     Written Home Exercises Provided: yes.  Exercises were reviewed and Corie was able to demonstrate them prior to the end of the session.  Corie demonstrated good  understanding of the education provided. See EMR under Patient Instructions for exercises provided during therapy sessions.    Assessment     Patient tolerated session well today. Regressed to incorporate more and more muscular reeducation today for her core and hips. Patient reports no adverse symptoms throughout session. Increased focus on soft tissue mobilization bilaterally today in order to reduce muscle tension and pain.     Corie is progressing well towards her goals.   Patient prognosis is Excellent.     Patient will continue to benefit from skilled outpatient physical therapy to address the deficits listed in the problem list box on initial evaluation, provide pt/family education and to maximize patient's level of independence in the home and community environment.     Patient's spiritual, cultural and educational needs considered and pt agreeable to plan of care and goals.     Anticipated Barriers for therapy: none     Short Term Goals:  4 weeks Status  Date Met   PAIN: Pt will report worst pain of 5/10 in order to progress toward max functional ability and improve quality of life. [x] Progressing  [] Met  [] Not Met     FUNCTION: Patient will demonstrate improved function as indicated by a score of greater than or equal to 61 out of 100 on FOTO. [x] Progressing  [] Met  [] Not Met     MOBILITY: Patient will improve AROM to 50% of stated goals, listed in objective measures above, in order to progress towards independence with functional  activities.  [x] Progressing  [] Met  [] Not Met     STRENGTH: Patient will improve strength to 50% of stated goals, listed in objective measures above, in order to progress towards independence with functional activities. [x] Progressing  [] Met  [] Not Met     HEP: Patient will demonstrate independence with HEP in order to progress toward functional independence. [x] Progressing  [] Met  [] Not Met        Long Term Goals:  8 weeks Status Date Met   PAIN: Pt will report worst pain of 2/10 in order to progress toward max functional ability and improve quality of life [x] Progressing  [] Met  [] Not Met     FUNCTION: Patient will demonstrate improved function as indicated by a score of greater than or equal to 70 out of 100 on FOTO. [x] Progressing  [] Met  [] Not Met     MOBILITY: Patient will improve AROM to stated goals, listed in objective measures above, in order to return to maximal functional potential and improve quality of life.  [x] Progressing  [] Met  [] Not Met     STRENGTH: Patient will improve strength to stated goals, listed in objective measures above, in order to improve functional independence and quality of life.  [x] Progressing  [] Met  [] Not Met     GAIT: Patient will demonstrate normalized gait mechanics with minimal compensation in order to return to PLOF. [x] Progressing  [] Met  [] Not Met     Patient will return to normal ADL's, IADL's, community involvement, recreational activities, and work-related activities with less than or equal to 2/10 pain and maximal function.  [x] Progressing  [] Met  [x] Not Met        Plan     Continue Plan of Care (POC) and progress per patient tolerance. See treatment section for details on planned progressions next session.      Yanni Oh, PT

## 2024-09-16 ENCOUNTER — CLINICAL SUPPORT (OUTPATIENT)
Dept: REHABILITATION | Facility: HOSPITAL | Age: 54
End: 2024-09-16
Payer: COMMERCIAL

## 2024-09-16 DIAGNOSIS — Z74.09 DECREASED FUNCTIONAL MOBILITY AND ENDURANCE: Primary | ICD-10-CM

## 2024-09-16 DIAGNOSIS — M62.81 PROXIMAL MUSCLE WEAKNESS: ICD-10-CM

## 2024-09-16 PROCEDURE — 97140 MANUAL THERAPY 1/> REGIONS: CPT

## 2024-09-16 PROCEDURE — 97112 NEUROMUSCULAR REEDUCATION: CPT

## 2024-09-16 PROCEDURE — 97110 THERAPEUTIC EXERCISES: CPT

## 2024-09-16 PROCEDURE — 97530 THERAPEUTIC ACTIVITIES: CPT

## 2024-09-18 ENCOUNTER — TELEPHONE (OUTPATIENT)
Dept: PAIN MEDICINE | Facility: CLINIC | Age: 54
End: 2024-09-18
Payer: COMMERCIAL

## 2024-09-18 NOTE — TELEPHONE ENCOUNTER
Patient called and confirmed time and location for appointment. Patient given instructions about how our Interventional Pain Department practices. Patient advised to bring any prior images to the appointment.     Called patient at 1:25 pm on 9/18/24 to confirm appt and explain IPM practice, left message on mobile to call office back, couldn't leave message on home number.

## 2024-09-19 NOTE — PROGRESS NOTES
[facility]   Physical Therapy Treatment Note        Name: Corie Carreno  Clinic Number: [id number]    Therapy Diagnosis:   Encounter Diagnoses   Name Primary?    Decreased functional mobility and endurance Yes    Proximal muscle weakness        Physician: Juan Miguel Aj MD    Visit Date: 9/16/2024    Physician Orders: PT Eval and Treat  Medical Diagnosis from Referral: Sciatica, unspecified laterality [M54.30]   Evaluation Date: 8/7/2024  Authorization Period Expiration: 8/7/2025   Plan of Care Expiration: 10/7/2024  Progress Note Due: 9/7/2024  Visit # / Visits authorized: 6/12 (+1 for evaluation)   FOTO: 1/3 (last performed on 8/7/2024)     Precautions: Standard     Time In: 1130  Time Out: 1232  Total Billable Time: 56 minutes (Billing reflects 1 on 1 treatment time spent with patient)    Subjective     Patient reports: She is feeling better compared to previous session but does not feel like she is back to her best yet    He/She was compliant with home exercise program.  Response to previous treatment: significant decrease in pain for ~ 1 day   Functional change: no significant changes noted     Pain: NT/10     Location: R hip to posterior thigh     Objective      Objective Measures updated at progress report or POC update only unless otherwise noted.       Treatment     Corie received the treatments listed below:     CPT Intervention Performed   Today Duration / Intensity   MT Soft tissue mobilization  x B glutes and piriformis    TE Recumbent bike  x Level 3 for 7 mins      Hamstring stretch - strap x 1 min      Single knee to chest -strap   30s      Lower trunk rotations- figure 4  x 2 mins, performed b      Gastrocnemius stretch   2x30s      Soleus stretch    2x30s b    NMR Posterior pelvic tilt    2 mins x 5s holds     Abdominal bracing    3 mins x 5s holds, cueing to prevent breath holding      Straight leg raise flexion    2x10 b      Straight leg raise abduction  x 10x b       Clamshells  x 2x10 b      Seated hip IR  x Red band 3x10      Single leg stance x 3x30s b      Sciatic nerve - slump position X    seated: 2 mins x 5s holds each direction, performed b     Steamboats    2x10 b      Bridge + posterior pelvic tilt  x 3 mins  with 5s holds              TA Leg press - seat angle 5  x    Double le# 3x10   Single le# 3x10  b      Knee extensions    Double le# 3x12     Hamstring curls    Double le# 3x12     Lateral squat walks   Red band 4 laps along mirror      Box squats    24 inch 1x10 then discontinued      Side stepping with band  x Red band, 3 laps              PLAN Step ups              CPT Codes available for Billing:   (18) minutes of Manual therapy (MT) to improve pain and ROM.  (12) minutes of Therapeutic Exercise (TE) to develop strength, endurance, range of motion, and flexibility.  (18) minutes of Neuromuscular Re-Education (NMR)  to improve: Balance, Coordination, Kinesthetic, Sense, Proprioception, and Posture.  (08) minutes of Therapeutic Activities (TA) to improve functional performance.  Vasopneumatic Device Therapy () for management of swelling/edema. (32601)  Unattended Electrical Stimulation (ES) for muscle performance or pain modulation.  BFR: Blood flow restriction applied during exercise    Patient Education and Home Exercises       Home Exercises Provided and Patient Education Provided     Education provided: (time included with treatment) minutes  PURPOSE: Patient educated on the impairments noted above and the effects of physical therapy intervention to improve overall condition and QOL.   EXERCISE: Patient was educated on all the above exercise prior/during/after for proper posture, positioning, and execution for safe performance with home exercise program.   STRENGTH: Patient educated on the importance of improved core and extremity strength in order to improve alignment of the spine and extremities with static positions and dynamic  movement.   GAIT & BALANCE: Patient educated on the importance of strong core and lower extremity musculature in order to improve both static and dynamic balance, improve gait mechanics, reduce fall risk and improve household and community mobility.     Written Home Exercises Provided: yes.  Exercises were reviewed and Corie was able to demonstrate them prior to the end of the session.  Corie demonstrated good  understanding of the education provided. See EMR under Patient Instructions for exercises provided during therapy sessions.    Assessment     Patient tolerated session well today. Increased time spent on manual interventions to improve muscle tension and pain. Added sidestepping and resumed leg press and single experience in order to improve glute activation and strength.    Corie is progressing well towards her goals.   Patient prognosis is Excellent.     Patient will continue to benefit from skilled outpatient physical therapy to address the deficits listed in the problem list box on initial evaluation, provide pt/family education and to maximize patient's level of independence in the home and community environment.     Patient's spiritual, cultural and educational needs considered and pt agreeable to plan of care and goals.     Anticipated Barriers for therapy: none     Short Term Goals:  4 weeks Status  Date Met   PAIN: Pt will report worst pain of 5/10 in order to progress toward max functional ability and improve quality of life. [x] Progressing  [] Met  [] Not Met     FUNCTION: Patient will demonstrate improved function as indicated by a score of greater than or equal to 61 out of 100 on FOTO. [x] Progressing  [] Met  [] Not Met     MOBILITY: Patient will improve AROM to 50% of stated goals, listed in objective measures above, in order to progress towards independence with functional activities.  [x] Progressing  [] Met  [] Not Met     STRENGTH: Patient will improve strength to 50% of stated goals,  listed in objective measures above, in order to progress towards independence with functional activities. [x] Progressing  [] Met  [] Not Met     HEP: Patient will demonstrate independence with HEP in order to progress toward functional independence. [x] Progressing  [] Met  [] Not Met        Long Term Goals:  8 weeks Status Date Met   PAIN: Pt will report worst pain of 2/10 in order to progress toward max functional ability and improve quality of life [x] Progressing  [] Met  [] Not Met     FUNCTION: Patient will demonstrate improved function as indicated by a score of greater than or equal to 70 out of 100 on FOTO. [x] Progressing  [] Met  [] Not Met     MOBILITY: Patient will improve AROM to stated goals, listed in objective measures above, in order to return to maximal functional potential and improve quality of life.  [x] Progressing  [] Met  [] Not Met     STRENGTH: Patient will improve strength to stated goals, listed in objective measures above, in order to improve functional independence and quality of life.  [x] Progressing  [] Met  [] Not Met     GAIT: Patient will demonstrate normalized gait mechanics with minimal compensation in order to return to PLOF. [x] Progressing  [] Met  [] Not Met     Patient will return to normal ADL's, IADL's, community involvement, recreational activities, and work-related activities with less than or equal to 2/10 pain and maximal function.  [x] Progressing  [] Met  [x] Not Met        Plan     Continue Plan of Care (POC) and progress per patient tolerance. See treatment section for details on planned progressions next session.      Yanni Oh, PT

## 2024-09-23 ENCOUNTER — CLINICAL SUPPORT (OUTPATIENT)
Dept: REHABILITATION | Facility: HOSPITAL | Age: 54
End: 2024-09-23
Payer: COMMERCIAL

## 2024-09-23 DIAGNOSIS — M62.81 PROXIMAL MUSCLE WEAKNESS: ICD-10-CM

## 2024-09-23 DIAGNOSIS — Z74.09 DECREASED FUNCTIONAL MOBILITY AND ENDURANCE: Primary | ICD-10-CM

## 2024-09-23 PROCEDURE — 97112 NEUROMUSCULAR REEDUCATION: CPT

## 2024-09-23 PROCEDURE — 97140 MANUAL THERAPY 1/> REGIONS: CPT

## 2024-09-23 PROCEDURE — 97110 THERAPEUTIC EXERCISES: CPT

## 2024-09-23 NOTE — PROGRESS NOTES
[facility]   Physical Therapy Treatment Note        Name: Corie Carreno  Clinic Number: [id number]    Therapy Diagnosis:   Encounter Diagnoses   Name Primary?    Decreased functional mobility and endurance Yes    Proximal muscle weakness        Physician: Juan Miguel Aj MD    Visit Date: 9/23/2024    Physician Orders: PT Eval and Treat  Medical Diagnosis from Referral: Sciatica, unspecified laterality [M54.30]   Evaluation Date: 8/7/2024  Authorization Period Expiration: 8/7/2025   Plan of Care Expiration: 10/7/2024  Progress Note Due: 9/7/2024  Visit # / Visits authorized: 9/12 (+1 for evaluation)   FOTO: 1/3 (last performed on 8/7/2024)     Precautions: Standard     Time In: 1130  Time Out: 1228  Total Billable Time: 52 minutes (Billing reflects 1 on 1 treatment time spent with patient)    Subjective     Patient reports: She is feeling much better this week but still feels tension in the hip    He/She was compliant with home exercise program.  Response to previous treatment: significant decrease in pain for ~ 1 day   Functional change: no significant changes noted     Pain: NT/10     Location: R hip to posterior thigh     Objective      Objective Measures updated at progress report or POC update only unless otherwise noted.       Treatment     Corie received the treatments listed below:     CPT Intervention Performed   Today Duration / Intensity   MT Soft tissue mobilization    B glutes and piriformis      Functional Dry Needling  Palpation Assessment to determine the necessity for  Functional Dry Needling with electrical stimulation.   See EMR under MEDIA for written consent  provided on 9/23/2024 x R glutes and piriformis    TE Recumbent bike  x Level 3 for 8 mins      Hamstring stretch - strap   1 min      Single knee to chest -strap   30s      Lower trunk rotations- figure 4    2 mins, performed b      Gastrocnemius stretch   2x30s      Soleus stretch    2x30s b    NMR Posterior  pelvic tilt    2 mins x 5s holds     Abdominal bracing    3 mins x 5s holds, cueing to prevent breath holding      Straight leg raise flexion    2x10 b      Straight leg raise abduction    10x b      Clamshells  x 2x10 b      Seated hip IR  x Red band 3x10      Single leg stance   3x30s b      Sciatic nerve - slump position      seated: 2 mins x 5s holds each direction, performed b     Steamboats    2x10 b      Bridge + posterior pelvic tilt  x 3 mins  with 5s holds              TA Leg press - seat angle 5       Double le# 3x10   Single le# 3x10  b      Knee extensions    Double le# 3x12     Hamstring curls    Double le# 3x12     Lateral squat walks   Red band 4 laps along mirror      Box squats    24 inch 1x10 then discontinued      Side stepping with band    Red band, 3 laps              PLAN Step ups              CPT Codes available for Billing:   (30) minutes of Manual therapy (MT) to improve pain and ROM.  (08) minutes of Therapeutic Exercise (TE) to develop strength, endurance, range of motion, and flexibility.  (14) minutes of Neuromuscular Re-Education (NMR)  to improve: Balance, Coordination, Kinesthetic, Sense, Proprioception, and Posture.  (00) minutes of Therapeutic Activities (TA) to improve functional performance.  Vasopneumatic Device Therapy () for management of swelling/edema. (66681)  Unattended Electrical Stimulation (ES) for muscle performance or pain modulation.  BFR: Blood flow restriction applied during exercise    Patient Education and Home Exercises       Home Exercises Provided and Patient Education Provided     Education provided: (time included with treatment) minutes  PURPOSE: Patient educated on the impairments noted above and the effects of physical therapy intervention to improve overall condition and QOL.   EXERCISE: Patient was educated on all the above exercise prior/during/after for proper posture, positioning, and execution for safe performance with home  exercise program.   STRENGTH: Patient educated on the importance of improved core and extremity strength in order to improve alignment of the spine and extremities with static positions and dynamic movement.   GAIT & BALANCE: Patient educated on the importance of strong core and lower extremity musculature in order to improve both static and dynamic balance, improve gait mechanics, reduce fall risk and improve household and community mobility.     Written Home Exercises Provided: yes.  Exercises were reviewed and Corie was able to demonstrate them prior to the end of the session.  Corie demonstrated good  understanding of the education provided. See EMR under Patient Instructions for exercises provided during therapy sessions.    Assessment     Pt tolerated session well today. It was determined that this patient would benefit from the use of functional dry needling after being cleared for all contraindications. Verbal and written consent obtained. Patient demonstrated appropriate response to Functional Dry Needling with good rhythmical contractions observed with estim to treated muscle groups.     Corie is progressing well towards her goals.   Patient prognosis is Excellent.     Patient will continue to benefit from skilled outpatient physical therapy to address the deficits listed in the problem list box on initial evaluation, provide pt/family education and to maximize patient's level of independence in the home and community environment.     Patient's spiritual, cultural and educational needs considered and pt agreeable to plan of care and goals.     Anticipated Barriers for therapy: none     Short Term Goals:  4 weeks Status  Date Met   PAIN: Pt will report worst pain of 5/10 in order to progress toward max functional ability and improve quality of life. [x] Progressing  [] Met  [] Not Met     FUNCTION: Patient will demonstrate improved function as indicated by a score of greater than or equal to 61 out of 100  on FOTO. [x] Progressing  [] Met  [] Not Met     MOBILITY: Patient will improve AROM to 50% of stated goals, listed in objective measures above, in order to progress towards independence with functional activities.  [x] Progressing  [] Met  [] Not Met     STRENGTH: Patient will improve strength to 50% of stated goals, listed in objective measures above, in order to progress towards independence with functional activities. [x] Progressing  [] Met  [] Not Met     HEP: Patient will demonstrate independence with HEP in order to progress toward functional independence. [x] Progressing  [] Met  [] Not Met        Long Term Goals:  8 weeks Status Date Met   PAIN: Pt will report worst pain of 2/10 in order to progress toward max functional ability and improve quality of life [x] Progressing  [] Met  [] Not Met     FUNCTION: Patient will demonstrate improved function as indicated by a score of greater than or equal to 70 out of 100 on FOTO. [x] Progressing  [] Met  [] Not Met     MOBILITY: Patient will improve AROM to stated goals, listed in objective measures above, in order to return to maximal functional potential and improve quality of life.  [x] Progressing  [] Met  [] Not Met     STRENGTH: Patient will improve strength to stated goals, listed in objective measures above, in order to improve functional independence and quality of life.  [x] Progressing  [] Met  [] Not Met     GAIT: Patient will demonstrate normalized gait mechanics with minimal compensation in order to return to PLOF. [x] Progressing  [] Met  [] Not Met     Patient will return to normal ADL's, IADL's, community involvement, recreational activities, and work-related activities with less than or equal to 2/10 pain and maximal function.  [x] Progressing  [] Met  [x] Not Met        Plan     Continue Plan of Care (POC) and progress per patient tolerance. See treatment section for details on planned progressions next session.      Yanni Oh  PT

## 2024-09-26 ENCOUNTER — CLINICAL SUPPORT (OUTPATIENT)
Dept: REHABILITATION | Facility: HOSPITAL | Age: 54
End: 2024-09-26
Payer: COMMERCIAL

## 2024-09-26 DIAGNOSIS — M62.81 PROXIMAL MUSCLE WEAKNESS: ICD-10-CM

## 2024-09-26 DIAGNOSIS — Z74.09 DECREASED FUNCTIONAL MOBILITY AND ENDURANCE: Primary | ICD-10-CM

## 2024-09-26 PROCEDURE — 97110 THERAPEUTIC EXERCISES: CPT

## 2024-09-26 PROCEDURE — 97112 NEUROMUSCULAR REEDUCATION: CPT

## 2024-09-26 PROCEDURE — 97530 THERAPEUTIC ACTIVITIES: CPT

## 2024-09-26 PROCEDURE — 97140 MANUAL THERAPY 1/> REGIONS: CPT

## 2024-09-27 NOTE — PROGRESS NOTES
[facility]   Physical Therapy Treatment Note        Name: Corie Carreno  Clinic Number: [id number]    Therapy Diagnosis:   Encounter Diagnoses   Name Primary?    Decreased functional mobility and endurance Yes    Proximal muscle weakness        Physician: Juan Miguel Aj MD    Visit Date: 9/26/2024    Physician Orders: PT Eval and Treat  Medical Diagnosis from Referral: Sciatica, unspecified laterality [M54.30]   Evaluation Date: 8/7/2024  Authorization Period Expiration: 8/7/2025   Plan of Care Expiration: 10/7/2024  Progress Note Due: 9/7/2024  Visit # / Visits authorized: 9/12 (+1 for evaluation)   FOTO: 1/3 (last performed on 8/7/2024)     Precautions: Standard     Time In: 1100  Time Out: 1202  Total Billable Time: 54 minutes (Billing reflects 1 on 1 treatment time spent with patient)    Subjective     Patient reports: She noticed a significant improvement in pain and tension following functional dry needling performed previous session    He/She was compliant with home exercise program.  Response to previous treatment: significant decrease in pain for ~ 1 day   Functional change: no significant changes noted     Pain: NT/10     Location: R hip to posterior thigh     Objective      Objective Measures updated at progress report or POC update only unless otherwise noted.       Treatment     Corie received the treatments listed below:     CPT Intervention Performed   Today Duration / Intensity   MT Soft tissue mobilization  x B glutes and piriformis      Functional Dry Needling  Palpation Assessment to determine the necessity for  Functional Dry Needling with electrical stimulation.   See EMR under MEDIA for written consent  provided on 9/23/2024   R glutes and piriformis    TE Recumbent bike  x Level 3 for 8 mins      Hamstring stretch - strap x 1 min b      Single knee to chest -strap   30s      Lower trunk rotations- figure 4    2 mins, performed b      Gastrocnemius stretch   2x30s       Soleus stretch    2x30s b    NMR Posterior pelvic tilt  x 3 mins x 5s holds     Abdominal bracing    3 mins x 5s holds, cueing to prevent breath holding      Straight leg raise flexion    2x10 b      Straight leg raise abduction    10x b      Clamshells  x 3x10 with 5s holds      Seated hip IR  x Green band 3x10      Single leg stance x 3x30s b      Sciatic nerve - slump position      seated: 2 mins x 5s holds each direction, performed b     Steamboats    2x10 b      Bridge + posterior pelvic tilt  x 3 mins  with 5s holds              TA Leg press - seat angle 5  x    Double le# 3x10   Single le# 3x10  b      Knee extensions    Double le# 3x12     Hamstring curls    Double le# 3x12     Box squats    24 inch 1x10 then discontinued      Side stepping with band  x Green band, 4 laps              PLAN Step ups              CPT Codes available for Billing:   (10) minutes of Manual therapy (MT) to improve pain and ROM.  (10) minutes of Therapeutic Exercise (TE) to develop strength, endurance, range of motion, and flexibility.  (22) minutes of Neuromuscular Re-Education (NMR)  to improve: Balance, Coordination, Kinesthetic, Sense, Proprioception, and Posture.--  (12) minutes of Therapeutic Activities (TA) to improve functional performance.  Vasopneumatic Device Therapy () for management of swelling/edema. (91225)  Unattended Electrical Stimulation (ES) for muscle performance or pain modulation.  BFR: Blood flow restriction applied during exercise    Patient Education and Home Exercises       Home Exercises Provided and Patient Education Provided     Education provided: (time included with treatment) minutes  PURPOSE: Patient educated on the impairments noted above and the effects of physical therapy intervention to improve overall condition and QOL.   EXERCISE: Patient was educated on all the above exercise prior/during/after for proper posture, positioning, and execution for safe performance with  home exercise program.   STRENGTH: Patient educated on the importance of improved core and extremity strength in order to improve alignment of the spine and extremities with static positions and dynamic movement.   GAIT & BALANCE: Patient educated on the importance of strong core and lower extremity musculature in order to improve both static and dynamic balance, improve gait mechanics, reduce fall risk and improve household and community mobility.     Written Home Exercises Provided: yes.  Exercises were reviewed and Corie was able to demonstrate them prior to the end of the session.  Corie demonstrated good  understanding of the education provided. See EMR under Patient Instructions for exercises provided during therapy sessions.    Assessment     Pt tolerated session well today. Added hold time with clamshells and increased resistance with seated hip IR to improve strength and endurance of the hip rotators. Pt continues to have significant muscle tension and tenderness to palpation with soft tissue mobilization but notes significant relief of pain and tension following intervention.     Corie is progressing well towards her goals.   Patient prognosis is Excellent.     Patient will continue to benefit from skilled outpatient physical therapy to address the deficits listed in the problem list box on initial evaluation, provide pt/family education and to maximize patient's level of independence in the home and community environment.     Patient's spiritual, cultural and educational needs considered and pt agreeable to plan of care and goals.     Anticipated Barriers for therapy: none     Short Term Goals:  4 weeks Status  Date Met   PAIN: Pt will report worst pain of 5/10 in order to progress toward max functional ability and improve quality of life. [x] Progressing  [] Met  [] Not Met     FUNCTION: Patient will demonstrate improved function as indicated by a score of greater than or equal to 61 out of 100 on FOTO.  [x] Progressing  [] Met  [] Not Met     MOBILITY: Patient will improve AROM to 50% of stated goals, listed in objective measures above, in order to progress towards independence with functional activities.  [x] Progressing  [] Met  [] Not Met     STRENGTH: Patient will improve strength to 50% of stated goals, listed in objective measures above, in order to progress towards independence with functional activities. [x] Progressing  [] Met  [] Not Met     HEP: Patient will demonstrate independence with HEP in order to progress toward functional independence. [x] Progressing  [] Met  [] Not Met        Long Term Goals:  8 weeks Status Date Met   PAIN: Pt will report worst pain of 2/10 in order to progress toward max functional ability and improve quality of life [x] Progressing  [] Met  [] Not Met     FUNCTION: Patient will demonstrate improved function as indicated by a score of greater than or equal to 70 out of 100 on FOTO. [x] Progressing  [] Met  [] Not Met     MOBILITY: Patient will improve AROM to stated goals, listed in objective measures above, in order to return to maximal functional potential and improve quality of life.  [x] Progressing  [] Met  [] Not Met     STRENGTH: Patient will improve strength to stated goals, listed in objective measures above, in order to improve functional independence and quality of life.  [x] Progressing  [] Met  [] Not Met     GAIT: Patient will demonstrate normalized gait mechanics with minimal compensation in order to return to PLOF. [x] Progressing  [] Met  [] Not Met     Patient will return to normal ADL's, IADL's, community involvement, recreational activities, and work-related activities with less than or equal to 2/10 pain and maximal function.  [x] Progressing  [] Met  [x] Not Met        Plan     Continue Plan of Care (POC) and progress per patient tolerance. See treatment section for details on planned progressions next session.      Yanni Oh,  PT

## 2024-09-30 ENCOUNTER — CLINICAL SUPPORT (OUTPATIENT)
Dept: REHABILITATION | Facility: HOSPITAL | Age: 54
End: 2024-09-30
Payer: COMMERCIAL

## 2024-09-30 DIAGNOSIS — M62.81 PROXIMAL MUSCLE WEAKNESS: ICD-10-CM

## 2024-09-30 DIAGNOSIS — Z74.09 DECREASED FUNCTIONAL MOBILITY AND ENDURANCE: Primary | ICD-10-CM

## 2024-09-30 PROCEDURE — 97140 MANUAL THERAPY 1/> REGIONS: CPT

## 2024-09-30 PROCEDURE — 97110 THERAPEUTIC EXERCISES: CPT

## 2024-09-30 PROCEDURE — 97112 NEUROMUSCULAR REEDUCATION: CPT

## 2024-09-30 NOTE — PROGRESS NOTES
[facility]   Physical Therapy Treatment Note        Name: Corie Carreno  Clinic Number: [id number]    Therapy Diagnosis:   Encounter Diagnoses   Name Primary?    Decreased functional mobility and endurance Yes    Proximal muscle weakness        Physician: Juan Miguel Aj MD    Visit Date: 9/30/2024    Physician Orders: PT Eval and Treat  Medical Diagnosis from Referral: Sciatica, unspecified laterality [M54.30]   Evaluation Date: 8/7/2024  Authorization Period Expiration: 8/7/2025   Plan of Care Expiration: 10/7/2024  Progress Note Due: 9/7/2024  Visit # / Visits authorized: 11/12 (+1 for evaluation)   FOTO: 1/3 (last performed on 8/7/2024)     Precautions: Standard     Time In: 1130  Time Out: 1228  Total Billable Time: 55 minutes (Billing reflects 1 on 1 treatment time spent with patient)    Subjective     Patient reports: She has been feeling pretty good overall and feels that the functional dry needling really helped     He/She was compliant with home exercise program.  Response to previous treatment: significant decrease in pain for ~ 1 day   Functional change: no significant changes noted     Pain: NT/10     Location: R hip to posterior thigh     Objective      Objective Measures updated at progress report or POC update only unless otherwise noted.       Treatment     Corie received the treatments listed below:     CPT Intervention Performed   Today Duration / Intensity   MT Soft tissue mobilization    B glutes and piriformis      Functional Dry Needling  Palpation Assessment to determine the necessity for  Functional Dry Needling with electrical stimulation.   See EMR under MEDIA for written consent  provided on 9/23/2024 x R glutes and piriformis    TE Recumbent bike  x Level 3 for 8 mins      Hamstring stretch - strap x 1 min b      Single knee to chest -strap   30s      Lower trunk rotations- figure 4  x 2 mins, performed b      Gastrocnemius stretch   2x30s      Soleus stretch     2x30s b    NMR Posterior pelvic tilt  x 3 mins x 5s holds     Abdominal bracing    3 mins x 5s holds, cueing to prevent breath holding      Straight leg raise flexion    2x10 b      Straight leg raise abduction    10x b      Clamshells  x 3x10 with 5s holds      Seated hip IR    Green band 3x10      Single leg stance   3x30s b      Sciatic nerve - slump position      seated: 2 mins x 5s holds each direction, performed b     Steamboats    2x10 b      Bridge + posterior pelvic tilt  x 3 mins  with 5s holds              TA Leg press - seat angle 5       Double le# 3x10   Single le# 3x10  b      Knee extensions    Double le# 3x12     Hamstring curls    Double le# 3x12     Box squats    24 inch 1x10 then discontinued      Side stepping with band    Green band, 4 laps              PLAN Step ups              CPT Codes available for Billing:   (20) minutes of Manual therapy (MT) to improve pain and ROM.  (15) minutes of Therapeutic Exercise (TE) to develop strength, endurance, range of motion, and flexibility.  (20) minutes of Neuromuscular Re-Education (NMR)  to improve: Balance, Coordination, Kinesthetic, Sense, Proprioception, and Posture.--  (00) minutes of Therapeutic Activities (TA) to improve functional performance.  Vasopneumatic Device Therapy () for management of swelling/edema. (93389)  Unattended Electrical Stimulation (ES) for muscle performance or pain modulation.  BFR: Blood flow restriction applied during exercise    Patient Education and Home Exercises       Home Exercises Provided and Patient Education Provided     Education provided: (time included with treatment) minutes  PURPOSE: Patient educated on the impairments noted above and the effects of physical therapy intervention to improve overall condition and QOL.   EXERCISE: Patient was educated on all the above exercise prior/during/after for proper posture, positioning, and execution for safe performance with home exercise  program.   STRENGTH: Patient educated on the importance of improved core and extremity strength in order to improve alignment of the spine and extremities with static positions and dynamic movement.   GAIT & BALANCE: Patient educated on the importance of strong core and lower extremity musculature in order to improve both static and dynamic balance, improve gait mechanics, reduce fall risk and improve household and community mobility.     Written Home Exercises Provided: yes.  Exercises were reviewed and Corie was able to demonstrate them prior to the end of the session.  Corie demonstrated good  understanding of the education provided. See EMR under Patient Instructions for exercises provided during therapy sessions.    Assessment     Pt tolerated session well today. It was determined that this patient would benefit from the use of functional dry needling after being cleared for all contraindications. Verbal and written consent obtained. Patient demonstrated appropriate response to Functional Dry Needling with fair rhythmical contractions observed with estim to treated muscle groups.     Corie is progressing well towards her goals.   Patient prognosis is Excellent.     Patient will continue to benefit from skilled outpatient physical therapy to address the deficits listed in the problem list box on initial evaluation, provide pt/family education and to maximize patient's level of independence in the home and community environment.     Patient's spiritual, cultural and educational needs considered and pt agreeable to plan of care and goals.     Anticipated Barriers for therapy: none     Short Term Goals:  4 weeks Status  Date Met   PAIN: Pt will report worst pain of 5/10 in order to progress toward max functional ability and improve quality of life. [x] Progressing  [] Met  [] Not Met     FUNCTION: Patient will demonstrate improved function as indicated by a score of greater than or equal to 61 out of 100 on FOTO.  [x] Progressing  [] Met  [] Not Met     MOBILITY: Patient will improve AROM to 50% of stated goals, listed in objective measures above, in order to progress towards independence with functional activities.  [x] Progressing  [] Met  [] Not Met     STRENGTH: Patient will improve strength to 50% of stated goals, listed in objective measures above, in order to progress towards independence with functional activities. [x] Progressing  [] Met  [] Not Met     HEP: Patient will demonstrate independence with HEP in order to progress toward functional independence. [x] Progressing  [] Met  [] Not Met        Long Term Goals:  8 weeks Status Date Met   PAIN: Pt will report worst pain of 2/10 in order to progress toward max functional ability and improve quality of life [x] Progressing  [] Met  [] Not Met     FUNCTION: Patient will demonstrate improved function as indicated by a score of greater than or equal to 70 out of 100 on FOTO. [x] Progressing  [] Met  [] Not Met     MOBILITY: Patient will improve AROM to stated goals, listed in objective measures above, in order to return to maximal functional potential and improve quality of life.  [x] Progressing  [] Met  [] Not Met     STRENGTH: Patient will improve strength to stated goals, listed in objective measures above, in order to improve functional independence and quality of life.  [x] Progressing  [] Met  [] Not Met     GAIT: Patient will demonstrate normalized gait mechanics with minimal compensation in order to return to PLOF. [x] Progressing  [] Met  [] Not Met     Patient will return to normal ADL's, IADL's, community involvement, recreational activities, and work-related activities with less than or equal to 2/10 pain and maximal function.  [x] Progressing  [] Met  [x] Not Met        Plan     Continue Plan of Care (POC) and progress per patient tolerance. See treatment section for details on planned progressions next session.      Yanni Oh,  PT

## 2024-10-02 ENCOUNTER — CLINICAL SUPPORT (OUTPATIENT)
Dept: REHABILITATION | Facility: HOSPITAL | Age: 54
End: 2024-10-02
Payer: COMMERCIAL

## 2024-10-02 DIAGNOSIS — M62.81 PROXIMAL MUSCLE WEAKNESS: ICD-10-CM

## 2024-10-02 DIAGNOSIS — Z74.09 DECREASED FUNCTIONAL MOBILITY AND ENDURANCE: Primary | ICD-10-CM

## 2024-10-02 PROCEDURE — 97140 MANUAL THERAPY 1/> REGIONS: CPT

## 2024-10-02 PROCEDURE — 97110 THERAPEUTIC EXERCISES: CPT

## 2024-10-02 PROCEDURE — 97112 NEUROMUSCULAR REEDUCATION: CPT

## 2024-10-02 NOTE — PROGRESS NOTES
[facility]   Physical Therapy Treatment Note       Name: Corie Carreno  Clinic Number: [id number]    Therapy Diagnosis:   Encounter Diagnoses   Name Primary?    Decreased functional mobility and endurance Yes    Proximal muscle weakness        Physician: Juan Miguel Aj MD    Visit Date: 10/2/2024    Physician Orders: PT Eval and Treat  Medical Diagnosis from Referral: Sciatica, unspecified laterality [M54.30]   Evaluation Date: 8/7/2024  Authorization Period Expiration: 8/7/2025   Plan of Care Expiration: 10/7/2024  Progress Note Due: 9/7/2024  Visit # / Visits authorized: 12/12 (+1 for evaluation)   FOTO: 1/3 (last performed on 8/7/2024)     Precautions: Standard     Time In: 1130  Time Out: 1228  Total Billable Time: 52 minutes (Billing reflects 1 on 1 treatment time spent with patient)    Subjective     Patient reports: She has been feeling better overall following functional dry needling performed previous session     He/She was compliant with home exercise program.  Response to previous treatment: significant decrease in pain for ~ 1 day   Functional change: no significant changes noted     Pain: NT/10     Location: R hip to posterior thigh     Objective      Objective Measures updated at progress report or POC update only unless otherwise noted.       Treatment     Corie received the treatments listed below:     CPT Intervention Performed   Today Duration / Intensity   MT Soft tissue mobilization  x B glutes and piriformis      Functional Dry Needling  Palpation Assessment to determine the necessity for  Functional Dry Needling with electrical stimulation.   See EMR under MEDIA for written consent  provided on 9/23/2024   R glutes and piriformis    TE Recumbent bike  x Level 3 for 8 mins      Hamstring stretch - strap x 2x1 min  on R      Single knee to chest -strap   30s      Lower trunk rotations- figure 4  x 2 mins, performed b      Gastrocnemius stretch   2x30s      Soleus  Patient was out on unit, social with select peers. Denies SI,HI or AV hallucinations. Verbalizes anxiety 3 out of 10 do to problem with her knees , \"I'm unable to walk without pain. \"  Denies depression. Appetite is improved. Verbalizes no problem with sleep. \"Since admission my coping skills have improved and help to calm myself down. Compliant with medications. Attends groups. stretch    2x30s b    NMR Posterior pelvic tilt  x 3 mins x 5s holds     Abdominal bracing    3 mins x 5s holds, cueing to prevent breath holding      Straight leg raise flexion  x 2x10 b      Straight leg raise abduction    10x b      Clamshells  x 3x10 with 5s holds      Seated hip IR  x Green band 3x10      Single leg stance   3x30s b      Sciatic nerve - slump position      seated: 2 mins x 5s holds each direction, performed b     Steamboats    2x10 b      Bridge + posterior pelvic tilt  x 3 mins  with 5s holds              TA Leg press - seat angle 5       Double le# 3x10   Single le# 3x10  b      Knee extensions    Double le# 3x12     Hamstring curls    Double le# 3x12     Box squats    24 inch 1x10 then discontinued      Side stepping with band    Green band, 4 laps              PLAN Step ups              CPT Codes available for Billing:   (12) minutes of Manual therapy (MT) to improve pain and ROM.  (14) minutes of Therapeutic Exercise (TE) to develop strength, endurance, range of motion, and flexibility.  (24) minutes of Neuromuscular Re-Education (NMR)  to improve: Balance, Coordination, Kinesthetic, Sense, Proprioception, and Posture.--  (00) minutes of Therapeutic Activities (TA) to improve functional performance.  Vasopneumatic Device Therapy () for management of swelling/edema. (98528)  Unattended Electrical Stimulation (ES) for muscle performance or pain modulation.  BFR: Blood flow restriction applied during exercise    Patient Education and Home Exercises       Home Exercises Provided and Patient Education Provided     Education provided: (time included with treatment) minutes  PURPOSE: Patient educated on the impairments noted above and the effects of physical therapy intervention to improve overall condition and QOL.   EXERCISE: Patient was educated on all the above exercise prior/during/after for proper posture, positioning, and execution for safe performance with home  exercise program.   STRENGTH: Patient educated on the importance of improved core and extremity strength in order to improve alignment of the spine and extremities with static positions and dynamic movement.   GAIT & BALANCE: Patient educated on the importance of strong core and lower extremity musculature in order to improve both static and dynamic balance, improve gait mechanics, reduce fall risk and improve household and community mobility.     Written Home Exercises Provided: yes.  Exercises were reviewed and Corie was able to demonstrate them prior to the end of the session.  Corie demonstrated good  understanding of the education provided. See EMR under Patient Instructions for exercises provided during therapy sessions.    Assessment     Pt tolerated session well today. Continued previously performed interventions to continue to improve strength in the lower extremities and core. Decreased muscle tension and tenderness to palpation noted in the posterolateral hip.     Corie is progressing well towards her goals.   Patient prognosis is Excellent.     Patient will continue to benefit from skilled outpatient physical therapy to address the deficits listed in the problem list box on initial evaluation, provide pt/family education and to maximize patient's level of independence in the home and community environment.     Patient's spiritual, cultural and educational needs considered and pt agreeable to plan of care and goals.     Anticipated Barriers for therapy: none     Short Term Goals:  4 weeks Status  Date Met   PAIN: Pt will report worst pain of 5/10 in order to progress toward max functional ability and improve quality of life. [x] Progressing  [] Met  [] Not Met     FUNCTION: Patient will demonstrate improved function as indicated by a score of greater than or equal to 61 out of 100 on FOTO. [x] Progressing  [] Met  [] Not Met     MOBILITY: Patient will improve AROM to 50% of stated goals, listed in  objective measures above, in order to progress towards independence with functional activities.  [x] Progressing  [] Met  [] Not Met     STRENGTH: Patient will improve strength to 50% of stated goals, listed in objective measures above, in order to progress towards independence with functional activities. [x] Progressing  [] Met  [] Not Met     HEP: Patient will demonstrate independence with HEP in order to progress toward functional independence. [x] Progressing  [] Met  [] Not Met        Long Term Goals:  8 weeks Status Date Met   PAIN: Pt will report worst pain of 2/10 in order to progress toward max functional ability and improve quality of life [x] Progressing  [] Met  [] Not Met     FUNCTION: Patient will demonstrate improved function as indicated by a score of greater than or equal to 70 out of 100 on FOTO. [x] Progressing  [] Met  [] Not Met     MOBILITY: Patient will improve AROM to stated goals, listed in objective measures above, in order to return to maximal functional potential and improve quality of life.  [x] Progressing  [] Met  [] Not Met     STRENGTH: Patient will improve strength to stated goals, listed in objective measures above, in order to improve functional independence and quality of life.  [x] Progressing  [] Met  [] Not Met     GAIT: Patient will demonstrate normalized gait mechanics with minimal compensation in order to return to PLOF. [x] Progressing  [] Met  [] Not Met     Patient will return to normal ADL's, IADL's, community involvement, recreational activities, and work-related activities with less than or equal to 2/10 pain and maximal function.  [x] Progressing  [] Met  [x] Not Met        Plan     Continue Plan of Care (POC) and progress per patient tolerance. See treatment section for details on planned progressions next session.      Yanni Oh, PT

## 2024-10-07 ENCOUNTER — CLINICAL SUPPORT (OUTPATIENT)
Dept: REHABILITATION | Facility: HOSPITAL | Age: 54
End: 2024-10-07
Payer: COMMERCIAL

## 2024-10-07 DIAGNOSIS — M62.81 PROXIMAL MUSCLE WEAKNESS: ICD-10-CM

## 2024-10-07 DIAGNOSIS — Z74.09 DECREASED FUNCTIONAL MOBILITY AND ENDURANCE: Primary | ICD-10-CM

## 2024-10-07 PROCEDURE — 97140 MANUAL THERAPY 1/> REGIONS: CPT

## 2024-10-07 PROCEDURE — 97110 THERAPEUTIC EXERCISES: CPT

## 2024-10-07 PROCEDURE — 97112 NEUROMUSCULAR REEDUCATION: CPT

## 2024-10-08 NOTE — PROGRESS NOTES
[facility]   Physical Therapy Treatment Note       Name: Corie Carreno  Clinic Number: [id number]    Therapy Diagnosis:   Encounter Diagnoses   Name Primary?    Decreased functional mobility and endurance Yes    Proximal muscle weakness        Physician: Juan Miguel Aj MD    Visit Date: 10/7/2024    Physician Orders: PT Eval and Treat  Medical Diagnosis from Referral: Sciatica, unspecified laterality [M54.30]   Evaluation Date: 8/7/2024  Authorization Period Expiration: 8/7/2025   Plan of Care Expiration: 10/7/2024  Progress Note Due: 9/7/2024  Visit # / Visits authorized: 12/12 (+1 for evaluation)   FOTO: 1/3 (last performed on 8/7/2024)     Precautions: Standard     Time In: 1330  Time Out: 1428  Total Billable Time: 53 minutes (Billing reflects 1 on 1 treatment time spent with patient)    Subjective     Patient reports: She had been feeling great but noticed that she has had increased symptoms again since previous session    He/She was compliant with home exercise program.  Response to previous treatment: significant decrease in pain for ~ 1 day   Functional change: no significant changes noted     Pain: NT/10     Location: R hip to posterior thigh     Objective      Objective Measures updated at progress report or POC update only unless otherwise noted.       Treatment     Corie received the treatments listed below:     CPT Intervention Performed   Today Duration / Intensity   MT Soft tissue mobilization    B glutes and piriformis      Functional Dry Needling  Palpation Assessment to determine the necessity for  Functional Dry Needling with electrical stimulation.   See EMR under MEDIA for written consent  provided on 9/23/2024 x R glutes and piriformis    TE Recumbent bike  x Level 3 for 8 mins      Hamstring stretch - strap x 2x1 min  on R      Single knee to chest -strap   30s      Lower trunk rotations- figure 4  x 2 mins, performed b      Gastrocnemius stretch   2x30s      Soleus  stretch    2x30s b    NMR Posterior pelvic tilt    3 mins x 5s holds     Abdominal bracing    3 mins x 5s holds, cueing to prevent breath holding      Straight leg raise flexion    2x10 b      Straight leg raise abduction    10x b      Clamshells  x 3x10 with 5s holds      Seated hip IR  x Green band 3x10      Single leg stance   3x30s b      Sciatic nerve - slump position      seated: 2 mins x 5s holds each direction, performed b     Steamboats    2x10 b      Bridge + posterior pelvic tilt  x 3 mins  with 5s holds              TA Leg press - seat angle 5       Double le# 3x10   Single le# 3x10  b      Knee extensions    Double le# 3x12     Hamstring curls    Double le# 3x12     Box squats    24 inch 1x10 then discontinued      Side stepping with band    Green band, 4 laps              PLAN Step ups              CPT Codes available for Billing:   (25) minutes of Manual therapy (MT) to improve pain and ROM.  (14) minutes of Therapeutic Exercise (TE) to develop strength, endurance, range of motion, and flexibility.  (14) minutes of Neuromuscular Re-Education (NMR)  to improve: Balance, Coordination, Kinesthetic, Sense, Proprioception, and Posture.--  (00) minutes of Therapeutic Activities (TA) to improve functional performance.  Vasopneumatic Device Therapy () for management of swelling/edema. (29007)  Unattended Electrical Stimulation (ES) for muscle performance or pain modulation.  BFR: Blood flow restriction applied during exercise    Patient Education and Home Exercises       Home Exercises Provided and Patient Education Provided     Education provided: (time included with treatment) minutes  PURPOSE: Patient educated on the impairments noted above and the effects of physical therapy intervention to improve overall condition and QOL.   EXERCISE: Patient was educated on all the above exercise prior/during/after for proper posture, positioning, and execution for safe performance with home  exercise program.   STRENGTH: Patient educated on the importance of improved core and extremity strength in order to improve alignment of the spine and extremities with static positions and dynamic movement.   GAIT & BALANCE: Patient educated on the importance of strong core and lower extremity musculature in order to improve both static and dynamic balance, improve gait mechanics, reduce fall risk and improve household and community mobility.     Written Home Exercises Provided: yes.  Exercises were reviewed and Corie was able to demonstrate them prior to the end of the session.  Corie demonstrated good  understanding of the education provided. See EMR under Patient Instructions for exercises provided during therapy sessions.    Assessment     Encouraged pt to return to MD to discuss other options such as MIKAL or trigger point injections due to lingering symptoms. Pt has a follow-up appointment next week. It was determined that this patient would benefit from the use of functional dry needling after being cleared for all contraindications. Verbal and written consent obtained. Patient demonstrated appropriate response to Functional Dry Needling with good rhythmical contractions observed with estim to treated muscle groups.     Corie is progressing well towards her goals.   Patient prognosis is Excellent.     Patient will continue to benefit from skilled outpatient physical therapy to address the deficits listed in the problem list box on initial evaluation, provide pt/family education and to maximize patient's level of independence in the home and community environment.     Patient's spiritual, cultural and educational needs considered and pt agreeable to plan of care and goals.     Anticipated Barriers for therapy: none     Short Term Goals:  4 weeks Status  Date Met   PAIN: Pt will report worst pain of 5/10 in order to progress toward max functional ability and improve quality of life. [x] Progressing  [] Met  []  Not Met     FUNCTION: Patient will demonstrate improved function as indicated by a score of greater than or equal to 61 out of 100 on FOTO. [x] Progressing  [] Met  [] Not Met     MOBILITY: Patient will improve AROM to 50% of stated goals, listed in objective measures above, in order to progress towards independence with functional activities.  [x] Progressing  [] Met  [] Not Met     STRENGTH: Patient will improve strength to 50% of stated goals, listed in objective measures above, in order to progress towards independence with functional activities. [x] Progressing  [] Met  [] Not Met     HEP: Patient will demonstrate independence with HEP in order to progress toward functional independence. [x] Progressing  [] Met  [] Not Met        Long Term Goals:  8 weeks Status Date Met   PAIN: Pt will report worst pain of 2/10 in order to progress toward max functional ability and improve quality of life [x] Progressing  [] Met  [] Not Met     FUNCTION: Patient will demonstrate improved function as indicated by a score of greater than or equal to 70 out of 100 on FOTO. [x] Progressing  [] Met  [] Not Met     MOBILITY: Patient will improve AROM to stated goals, listed in objective measures above, in order to return to maximal functional potential and improve quality of life.  [x] Progressing  [] Met  [] Not Met     STRENGTH: Patient will improve strength to stated goals, listed in objective measures above, in order to improve functional independence and quality of life.  [x] Progressing  [] Met  [] Not Met     GAIT: Patient will demonstrate normalized gait mechanics with minimal compensation in order to return to PLOF. [x] Progressing  [] Met  [] Not Met     Patient will return to normal ADL's, IADL's, community involvement, recreational activities, and work-related activities with less than or equal to 2/10 pain and maximal function.  [x] Progressing  [] Met  [x] Not Met        Plan     Continue Plan of Care (POC) and  progress per patient tolerance. See treatment section for details on planned progressions next session.      Yanni Oh, PT

## 2024-10-10 ENCOUNTER — TELEPHONE (OUTPATIENT)
Dept: PAIN MEDICINE | Facility: CLINIC | Age: 54
End: 2024-10-10
Payer: COMMERCIAL

## 2024-10-10 NOTE — TELEPHONE ENCOUNTER
Patient called and confirmed time and location for appointment. Patient given instructions about how our Interventional Pain Department practices. Patient advised to bring any prior images to the appointment.     Spoke with patient, she is in PT with [facility] and was told the piriformis muscle is pressing on the sciatic nerve.  Patient was going to Intervention Pain with Dr. Pollock and had SI Joint injections.  Patient longer with practice due to physician left for a while.  Pain is in lower back right side of tailbone.

## 2024-10-14 ENCOUNTER — CLINICAL SUPPORT (OUTPATIENT)
Dept: REHABILITATION | Facility: HOSPITAL | Age: 54
End: 2024-10-14
Payer: COMMERCIAL

## 2024-10-14 DIAGNOSIS — Z74.09 DECREASED FUNCTIONAL MOBILITY AND ENDURANCE: Primary | ICD-10-CM

## 2024-10-14 DIAGNOSIS — M62.81 PROXIMAL MUSCLE WEAKNESS: ICD-10-CM

## 2024-10-14 PROCEDURE — 97110 THERAPEUTIC EXERCISES: CPT

## 2024-10-14 PROCEDURE — 97112 NEUROMUSCULAR REEDUCATION: CPT

## 2024-10-14 PROCEDURE — 97140 MANUAL THERAPY 1/> REGIONS: CPT

## 2024-10-15 ENCOUNTER — TELEPHONE (OUTPATIENT)
Dept: REHABILITATION | Facility: HOSPITAL | Age: 54
End: 2024-10-15
Payer: COMMERCIAL

## 2024-10-15 NOTE — PROGRESS NOTES
[facility]   Physical Therapy Treatment Note       Name: Corie Carreno  Clinic Number: [id number]    Therapy Diagnosis:   Encounter Diagnoses   Name Primary?    Decreased functional mobility and endurance Yes    Proximal muscle weakness        Physician: Juan Miguel Aj MD    Visit Date: 10/14/2024    Physician Orders: PT Eval and Treat  Medical Diagnosis from Referral: Sciatica, unspecified laterality [M54.30]   Evaluation Date: 8/7/2024  Authorization Period Expiration: 8/7/2025   Plan of Care Expiration: 10/7/2024  Progress Note Due: 9/7/2024  Visit # / Visits authorized: 12/12 (+1 for evaluation)   FOTO: 1/3 (last performed on 8/7/2024)     Precautions: Standard     Time In: 1130  Time Out: 1228  Total Billable Time: 53 minutes (Billing reflects 1 on 1 treatment time spent with patient)    Subjective     Patient reports: She is feeling a little better today but continues to get significant tension in the same muscles in her hip. Is wondering what next steps are to help decrease pain     He/She was compliant with home exercise program.  Response to previous treatment: significant decrease in pain for ~ 1 day   Functional change: no significant changes noted     Pain: NT/10     Location: R hip to posterior thigh     Objective      Objective Measures updated at progress report or POC update only unless otherwise noted.       Treatment     Corie received the treatments listed below:     CPT Intervention Performed   Today Duration / Intensity   MT Soft tissue mobilization  x R glutes and piriformis      Functional Dry Needling  Palpation Assessment to determine the necessity for  Functional Dry Needling with electrical stimulation.   See EMR under MEDIA for written consent  provided on 9/23/2024   R glutes and piriformis    TE Recumbent bike  x Level 3 for 8 mins      Hamstring stretch - strap x 2x1 min  on R      Single knee to chest -strap   30s      Lower trunk rotations- figure 4  x 2  mins, performed b      Gastrocnemius stretch   2x30s      Soleus stretch    2x30s b    NMR Posterior pelvic tilt    3 mins x 5s holds     Abdominal bracing - with biofeedback    3 mins x 5s holds, cueing to prevent breath holding      Straight leg raise flexion    2x10 b      Straight leg raise abduction    10x b      Clamshells  x 3x10 with 5s holds      Seated hip IR  x Green band 3x10      Single leg stance x 3x30s b      Sciatic nerve - slump position x    seated: 2 mins x 5s holds each direction, performed b     Steamboats    2x10 b      Bridge + posterior pelvic tilt  x 3 mins  with 5s holds              TA Leg press - seat angle 5       Double le# 3x10   Single le# 3x10  b      Knee extensions    Double le# 3x12     Hamstring curls    Double le# 3x12     Box squats    24 inch 1x10 then discontinued      Side stepping with band    Green band, 4 laps              PLAN Step ups              CPT Codes available for Billing:   (12) minutes of Manual therapy (MT) to improve pain and ROM.  (14) minutes of Therapeutic Exercise (TE) to develop strength, endurance, range of motion, and flexibility.  (27) minutes of Neuromuscular Re-Education (NMR)  to improve: Balance, Coordination, Kinesthetic, Sense, Proprioception, and Posture.--  (00) minutes of Therapeutic Activities (TA) to improve functional performance.  Vasopneumatic Device Therapy () for management of swelling/edema. (41033)  Unattended Electrical Stimulation (ES) for muscle performance or pain modulation.  BFR: Blood flow restriction applied during exercise    Patient Education and Home Exercises       Home Exercises Provided and Patient Education Provided     Education provided: (time included with treatment) minutes  PURPOSE: Patient educated on the impairments noted above and the effects of physical therapy intervention to improve overall condition and QOL.   EXERCISE: Patient was educated on all the above exercise prior/during/after  for proper posture, positioning, and execution for safe performance with home exercise program.   STRENGTH: Patient educated on the importance of improved core and extremity strength in order to improve alignment of the spine and extremities with static positions and dynamic movement.   GAIT & BALANCE: Patient educated on the importance of strong core and lower extremity musculature in order to improve both static and dynamic balance, improve gait mechanics, reduce fall risk and improve household and community mobility.     Written Home Exercises Provided: yes.  Exercises were reviewed and Corie was able to demonstrate them prior to the end of the session.  Corie demonstrated good  understanding of the education provided. See EMR under Patient Instructions for exercises provided during therapy sessions.    Assessment     Pt tolerated session well today. Pt and I discussed possible referral to PM&R for trigger point injections or pain management for further evaluation of her spine. Pt plans to look into this. Continue to incorporated soft tissue mobilization with significant reduction in pain noted following intervention.     Corie is progressing well towards her goals.   Patient prognosis is Excellent.     Patient will continue to benefit from skilled outpatient physical therapy to address the deficits listed in the problem list box on initial evaluation, provide pt/family education and to maximize patient's level of independence in the home and community environment.     Patient's spiritual, cultural and educational needs considered and pt agreeable to plan of care and goals.     Anticipated Barriers for therapy: none     Short Term Goals:  4 weeks Status  Date Met   PAIN: Pt will report worst pain of 5/10 in order to progress toward max functional ability and improve quality of life. [x] Progressing  [] Met  [] Not Met     FUNCTION: Patient will demonstrate improved function as indicated by a score of greater than  or equal to 61 out of 100 on FOTO. [x] Progressing  [] Met  [] Not Met     MOBILITY: Patient will improve AROM to 50% of stated goals, listed in objective measures above, in order to progress towards independence with functional activities.  [x] Progressing  [] Met  [] Not Met     STRENGTH: Patient will improve strength to 50% of stated goals, listed in objective measures above, in order to progress towards independence with functional activities. [x] Progressing  [] Met  [] Not Met     HEP: Patient will demonstrate independence with HEP in order to progress toward functional independence. [x] Progressing  [] Met  [] Not Met        Long Term Goals:  8 weeks Status Date Met   PAIN: Pt will report worst pain of 2/10 in order to progress toward max functional ability and improve quality of life [x] Progressing  [] Met  [] Not Met     FUNCTION: Patient will demonstrate improved function as indicated by a score of greater than or equal to 70 out of 100 on FOTO. [x] Progressing  [] Met  [] Not Met     MOBILITY: Patient will improve AROM to stated goals, listed in objective measures above, in order to return to maximal functional potential and improve quality of life.  [x] Progressing  [] Met  [] Not Met     STRENGTH: Patient will improve strength to stated goals, listed in objective measures above, in order to improve functional independence and quality of life.  [x] Progressing  [] Met  [] Not Met     GAIT: Patient will demonstrate normalized gait mechanics with minimal compensation in order to return to PLOF. [x] Progressing  [] Met  [] Not Met     Patient will return to normal ADL's, IADL's, community involvement, recreational activities, and work-related activities with less than or equal to 2/10 pain and maximal function.  [x] Progressing  [] Met  [x] Not Met        Plan     Continue Plan of Care (POC) and progress per patient tolerance. See treatment section for details on planned progressions next  session.      Yanni Oh, PT

## 2024-10-18 ENCOUNTER — CLINICAL SUPPORT (OUTPATIENT)
Dept: REHABILITATION | Facility: HOSPITAL | Age: 54
End: 2024-10-18
Payer: COMMERCIAL

## 2024-10-18 ENCOUNTER — OFFICE VISIT (OUTPATIENT)
Dept: PHYSICAL MEDICINE AND REHAB | Facility: CLINIC | Age: 54
End: 2024-10-18
Payer: COMMERCIAL

## 2024-10-18 VITALS — WEIGHT: 293 LBS | BODY MASS INDEX: 44.41 KG/M2 | RESPIRATION RATE: 13 BRPM | HEIGHT: 68 IN

## 2024-10-18 DIAGNOSIS — M62.81 PROXIMAL MUSCLE WEAKNESS: ICD-10-CM

## 2024-10-18 DIAGNOSIS — G57.01 PIRIFORMIS SYNDROME, RIGHT: Primary | ICD-10-CM

## 2024-10-18 DIAGNOSIS — Z74.09 DECREASED FUNCTIONAL MOBILITY AND ENDURANCE: Primary | ICD-10-CM

## 2024-10-18 PROCEDURE — 97140 MANUAL THERAPY 1/> REGIONS: CPT

## 2024-10-18 PROCEDURE — 99999 PR PBB SHADOW E&M-EST. PATIENT-LVL III: CPT | Mod: PBBFAC,,, | Performed by: PHYSICAL MEDICINE & REHABILITATION

## 2024-10-18 PROCEDURE — 97110 THERAPEUTIC EXERCISES: CPT

## 2024-10-18 PROCEDURE — 97112 NEUROMUSCULAR REEDUCATION: CPT

## 2024-10-18 RX ORDER — METHYLPREDNISOLONE ACETATE 40 MG/ML
40 INJECTION, SUSPENSION INTRA-ARTICULAR; INTRALESIONAL; INTRAMUSCULAR; SOFT TISSUE
Status: COMPLETED | OUTPATIENT
Start: 2024-10-18 | End: 2024-10-18

## 2024-10-18 RX ADMIN — METHYLPREDNISOLONE ACETATE 40 MG: 40 INJECTION, SUSPENSION INTRA-ARTICULAR; INTRALESIONAL; INTRAMUSCULAR; SOFT TISSUE at 11:10

## 2024-10-18 NOTE — PROGRESS NOTES
PM&[facility] NEW PATIENT HISTORY & PHYSICAL :    Referring Physician:    Chief Complaint   Patient presents with    Muscle Pain     Right piriformis       HPI: This is a 54 y.o.  female being seen in clinic today for evaluation of chronic low back and hip/gluteus achy pain and tightness.  She has known lumbar DJD/DDD but has had improvements with PT.  She would like to try TPI for piriformis pain.  Prolonged sitting can exacerbate her symptoms.     History obtained from patient    Functional History:  Walking: Not limited  Transfers: Independent  Assistive devices: No  Power mobility: No  Falls: None     Needs help with:  Nothing - all ADLS normal       Past family, medical, social, and surgical history reviewed in chart    Review of Systems:     General- denies lethargy, weight change, fever, chills  Head/neck- denies swallowing difficulties  ENT- denies hearing changes  Cardiovascular-denies chest pain  Pulmonary- denies shortness of breath  GI- denies constipation or bowel incontinence  - denies bladder incontinence  Skin- denies wounds or rashes  Musculoskeletal- denies weakness, + pain  Neurologic- denies numbness and tingling  Psychiatric- denies depressive or psychotic features, denies anxiety  Lymphatic-denies swelling  Endocrine- denies hypoglycemic symptoms/DM history  All other pertinent systems negative     Physical Examination:  General: Well developed, well nourished female, NAD  HEENT:NCAT EOMI bilaterally   Pulmonary:Normal respirations    Spinal Examination: CERVICAL  Active ROM is within normal limits.  Inspection: No deformity of spinal alignment.    Spinal Examination: LUMBAR or THORACIC  Active ROM is within normal limits.  Inspection: No deformity of spinal alignment.  No palpable olisthesis.  Palpation: No vertebral tenderness to percussion.  Ttp at si joints, very ttp at piriformis on right, tight paraspinals  SLR Test (seated and supine):negative bilaterally    Bilateral Upper and Lower  Extremities:  Pulses are 2+ at radial, bilaterally.  Shoulder/Elbow/Wrist/Hand ROM   Hip/Knee/Ankle ROM wnl  Bilateral Extremities show normal capillary refill.  No signs of cyanosis, rubor, edema, skin changes, or dysvascular changes of appendages.  Nails appear intact.    Neurological Exam:  Cranial Nerves:  II-XII grossly intact    Manual Muscle Testing: (Motor 5=normal)  RIGHT Lower extremity: Hip flexion 5/5, Hip Abduction 5/5, Hip Adduction 5/5, Knee extension 5/5, Knee flexion 5/5, Ankle dorsiflexion 5/5, Extensor hallucis longus 5/5, Ankle plantarflexion 5/5  LEFT Lower extremity:  Hip flexion 5/5, Hip Abduction 5/5,Hip Adduction 5/5, Knee extension 5/5, Knee flexion 5/5, Ankle dorsiflexion 5/5, Extensor hallucis longus 5/5, Ankle plantarflexion 5/5    No focal atrophy is noted of either upper or lower extremity.    Bilateral Reflexes:  No clonus at knee or ankle.    Sensation: tested to light touch  - intact in legs    Gait: Narrow base and good arm swing.      IMPRESSION/PLAN: This is a 54 y.o.  female with right piriformis syndrome, Lumbar DJD/DDD  Discussed in detail for 30 minutes about diagnosis and treatment plan    Cont PT  2. Piriformis injection  3. Handouts on post TPIs provided    Aixa Zamora M.D.  Physical Medicine and Rehab      PROCEDURE NOTE    Diagnosis: Piriformis syndrome-right  Procedure: Piriformis injection    Risks and benefits of procedure explained to patient including risks of infection, bleeding, pain, or damage to surrounding tissues. All questions answered. Informed consent obtained prior to proceeding. Areas marked and prepped in sterile fashion. Using a 25 G 3.5  inch needle, a 5cc mixture of depomedrol 1cc (40mg)and 1% lidocaine was injected into the piriformis muscle. Minimal to no bleeding noted. ER and post injection instructions given.    Aixa Zamora M.D.

## 2024-10-19 NOTE — PROGRESS NOTES
[facility]   Physical Therapy Treatment Note       Name: Corie Carerno  Clinic Number: [id number]    Therapy Diagnosis:   Encounter Diagnoses   Name Primary?    Decreased functional mobility and endurance Yes    Proximal muscle weakness        Physician: Juan Miguel Aj MD    Visit Date: 10/18/2024    Physician Orders: PT Eval and Treat  Medical Diagnosis from Referral: Sciatica, unspecified laterality [M54.30]   Evaluation Date: 8/7/2024  Authorization Period Expiration: 8/7/2025   Plan of Care Expiration: 10/7/2024  Progress Note Due: 9/7/2024  Visit # / Visits authorized: 15/32 (+1 for evaluation)   FOTO: 1/3 (last performed on 8/7/2024)     Precautions: Standard     Time In: 1330  Time Out: 1428  Total Billable Time: 55 minutes (Billing reflects 1 on 1 treatment time spent with patient)    Subjective     Patient reports:Pt had an appointment with Dr. Zamora today and had several trigger point injections in the posterolateral hip to help further reduce pain and muscle tension    He/She was compliant with home exercise program.  Response to previous treatment: significant decrease in pain for ~ 1 day   Functional change: no significant changes noted     Pain: NT/10     Location: R hip to posterior thigh     Objective      Objective Measures updated at progress report or POC update only unless otherwise noted.       Treatment     Corie received the treatments listed below:     CPT Intervention Performed   Today Duration / Intensity   MT Soft tissue mobilization  x R glutes and piriformis      Functional Dry Needling  Palpation Assessment to determine the necessity for  Functional Dry Needling with electrical stimulation.   See EMR under MEDIA for written consent  provided on 9/23/2024   R glutes and piriformis    TE Recumbent bike  x Level 3 for 8 mins      Hamstring stretch - strap x 2x1 min  on R      Single knee to chest -strap   30s      Lower trunk rotations- figure 4  x 2 mins,  performed b      Gastrocnemius stretch   2x30s      Soleus stretch    2x30s b    NMR Posterior pelvic tilt  x 3 mins x 5s holds     Abdominal bracing - with biofeedback  x 3 mins x 5s holds, cueing to prevent breath holding      Straight leg raise flexion    2x10 b      Straight leg raise abduction    10x b      Clamshells  x 3x10 with 5s holds      Seated hip IR  x Green band 3x10      Single leg stance   3x30s b      Sciatic nerve - slump position      seated: 2 mins x 5s holds each direction, performed b     Steamboats    2x10 b      Bridge + posterior pelvic tilt  x 3 mins  with 5s holds              TA Leg press - seat angle 5       Double le# 3x10   Single le# 3x10  b      Knee extensions    Double le# 3x12     Hamstring curls    Double le# 3x12     Box squats    24 inch 1x10 then discontinued      Side stepping with band    Green band, 4 laps              PLAN Step ups              CPT Codes available for Billing:   (14) minutes of Manual therapy (MT) to improve pain and ROM.  (18) minutes of Therapeutic Exercise (TE) to develop strength, endurance, range of motion, and flexibility.  (23) minutes of Neuromuscular Re-Education (NMR)  to improve: Balance, Coordination, Kinesthetic, Sense, Proprioception, and Posture.--  (00) minutes of Therapeutic Activities (TA) to improve functional performance.  Vasopneumatic Device Therapy () for management of swelling/edema. (95912)  Unattended Electrical Stimulation (ES) for muscle performance or pain modulation.  BFR: Blood flow restriction applied during exercise    Patient Education and Home Exercises       Home Exercises Provided and Patient Education Provided     Education provided: (time included with treatment) minutes  PURPOSE: Patient educated on the impairments noted above and the effects of physical therapy intervention to improve overall condition and QOL.   EXERCISE: Patient was educated on all the above exercise prior/during/after for  proper posture, positioning, and execution for safe performance with home exercise program.   STRENGTH: Patient educated on the importance of improved core and extremity strength in order to improve alignment of the spine and extremities with static positions and dynamic movement.   GAIT & BALANCE: Patient educated on the importance of strong core and lower extremity musculature in order to improve both static and dynamic balance, improve gait mechanics, reduce fall risk and improve household and community mobility.     Written Home Exercises Provided: yes.  Exercises were reviewed and Corie was able to demonstrate them prior to the end of the session.  Corie demonstrated good  understanding of the education provided. See EMR under Patient Instructions for exercises provided during therapy sessions.    Assessment     Pt tolerated session well today. Deferred functional dry needling due to trigger point injections today. Continued previously performed interventions to further promote core and hip strength, stability and endurance. Pt notes further relief of symptoms following soft tissue mobilization performed.     Corie is progressing well towards her goals.   Patient prognosis is Excellent.     Patient will continue to benefit from skilled outpatient physical therapy to address the deficits listed in the problem list box on initial evaluation, provide pt/family education and to maximize patient's level of independence in the home and community environment.     Patient's spiritual, cultural and educational needs considered and pt agreeable to plan of care and goals.     Anticipated Barriers for therapy: none     Short Term Goals:  4 weeks Status  Date Met   PAIN: Pt will report worst pain of 5/10 in order to progress toward max functional ability and improve quality of life. [x] Progressing  [] Met  [] Not Met     FUNCTION: Patient will demonstrate improved function as indicated by a score of greater than or equal  to 61 out of 100 on FOTO. [x] Progressing  [] Met  [] Not Met     MOBILITY: Patient will improve AROM to 50% of stated goals, listed in objective measures above, in order to progress towards independence with functional activities.  [x] Progressing  [] Met  [] Not Met     STRENGTH: Patient will improve strength to 50% of stated goals, listed in objective measures above, in order to progress towards independence with functional activities. [x] Progressing  [] Met  [] Not Met     HEP: Patient will demonstrate independence with HEP in order to progress toward functional independence. [x] Progressing  [] Met  [] Not Met        Long Term Goals:  8 weeks Status Date Met   PAIN: Pt will report worst pain of 2/10 in order to progress toward max functional ability and improve quality of life [x] Progressing  [] Met  [] Not Met     FUNCTION: Patient will demonstrate improved function as indicated by a score of greater than or equal to 70 out of 100 on FOTO. [x] Progressing  [] Met  [] Not Met     MOBILITY: Patient will improve AROM to stated goals, listed in objective measures above, in order to return to maximal functional potential and improve quality of life.  [x] Progressing  [] Met  [] Not Met     STRENGTH: Patient will improve strength to stated goals, listed in objective measures above, in order to improve functional independence and quality of life.  [x] Progressing  [] Met  [] Not Met     GAIT: Patient will demonstrate normalized gait mechanics with minimal compensation in order to return to PLOF. [x] Progressing  [] Met  [] Not Met     Patient will return to normal ADL's, IADL's, community involvement, recreational activities, and work-related activities with less than or equal to 2/10 pain and maximal function.  [x] Progressing  [] Met  [x] Not Met        Plan     Continue Plan of Care (POC) and progress per patient tolerance. See treatment section for details on planned progressions next session.      Yanni  Adriano, PT

## 2024-10-21 ENCOUNTER — CLINICAL SUPPORT (OUTPATIENT)
Dept: REHABILITATION | Facility: HOSPITAL | Age: 54
End: 2024-10-21
Payer: COMMERCIAL

## 2024-10-21 DIAGNOSIS — M62.81 PROXIMAL MUSCLE WEAKNESS: ICD-10-CM

## 2024-10-21 DIAGNOSIS — Z74.09 DECREASED FUNCTIONAL MOBILITY AND ENDURANCE: Primary | ICD-10-CM

## 2024-10-21 PROCEDURE — 97112 NEUROMUSCULAR REEDUCATION: CPT

## 2024-10-21 PROCEDURE — 97110 THERAPEUTIC EXERCISES: CPT

## 2024-10-21 PROCEDURE — 97140 MANUAL THERAPY 1/> REGIONS: CPT

## 2024-10-24 ENCOUNTER — PATIENT MESSAGE (OUTPATIENT)
Dept: PHYSICAL MEDICINE AND REHAB | Facility: CLINIC | Age: 54
End: 2024-10-24
Payer: COMMERCIAL

## 2024-11-01 ENCOUNTER — CLINICAL SUPPORT (OUTPATIENT)
Dept: REHABILITATION | Facility: HOSPITAL | Age: 54
End: 2024-11-01
Payer: COMMERCIAL

## 2024-11-01 DIAGNOSIS — M62.81 PROXIMAL MUSCLE WEAKNESS: ICD-10-CM

## 2024-11-01 DIAGNOSIS — Z74.09 DECREASED FUNCTIONAL MOBILITY AND ENDURANCE: Primary | ICD-10-CM

## 2024-11-01 PROCEDURE — 97140 MANUAL THERAPY 1/> REGIONS: CPT

## 2024-11-01 PROCEDURE — 97112 NEUROMUSCULAR REEDUCATION: CPT

## 2024-11-01 PROCEDURE — 97110 THERAPEUTIC EXERCISES: CPT

## 2024-11-04 ENCOUNTER — CLINICAL SUPPORT (OUTPATIENT)
Dept: REHABILITATION | Facility: HOSPITAL | Age: 54
End: 2024-11-04
Payer: COMMERCIAL

## 2024-11-04 DIAGNOSIS — Z74.09 DECREASED FUNCTIONAL MOBILITY AND ENDURANCE: Primary | ICD-10-CM

## 2024-11-04 DIAGNOSIS — M62.81 PROXIMAL MUSCLE WEAKNESS: ICD-10-CM

## 2024-11-04 PROCEDURE — 97110 THERAPEUTIC EXERCISES: CPT

## 2024-11-04 PROCEDURE — 97140 MANUAL THERAPY 1/> REGIONS: CPT

## 2024-11-04 PROCEDURE — 97112 NEUROMUSCULAR REEDUCATION: CPT

## 2024-11-04 NOTE — PROGRESS NOTES
[facility]   Physical Therapy Treatment Note        Name: Corie Carreno  Clinic Number: [id number]    Therapy Diagnosis:   Encounter Diagnoses   Name Primary?    Decreased functional mobility and endurance Yes    Proximal muscle weakness          Physician: Juan Miguel Aj MD    Visit Date: 11/4/2024    Physician Orders: PT Eval and Treat  Medical Diagnosis from Referral: Sciatica, unspecified laterality [M54.30]   Evaluation Date: 8/7/2024  Authorization Period Expiration: 8/7/2025   Plan of Care Expiration: 12/1/2024  Progress Note Due: 12/1/2024  Visit # / Visits authorized: 18/32 (+1 for evaluation)   FOTO: 1/3 (last performed on 8/7/2024)     Precautions: Standard     Time In: 0833  Time Out: 0935  Total Billable Time: 55 minutes (Billing reflects 1 on 1 treatment time spent with patient)    Subjective     Patient reports: She is feeling much better today and noticed a huge improvement following manual interventions performed previous session.     He/She was compliant with home exercise program.  Response to previous treatment: significant decrease in pain for ~ 1 day   Functional change: no significant changes noted     Pain: NT/10     Location: R hip to posterior thigh     Objective      Objective Measures updated at progress report or POC update only unless otherwise noted.        Treatment     Corie received the treatments listed below:        CPT Intervention Performed   Today Duration / Intensity   MT Soft tissue mobilization  x R glutes and piriformis      Functional Dry Needling  Palpation Assessment to determine the necessity for  Functional Dry Needling with electrical stimulation.   See EMR under MEDIA for written consent  provided on 9/23/2024   R glutes and piriformis    TE Recumbent bike  x Level 3 for 5 mins      Hamstring stretch - strap x 2x1 min  on R      Single knee to chest -strap   30s      Lower trunk rotations- figure 4  x 2 mins, performed b      Gastrocnemius  stretch   2x30s      Soleus stretch    2x30s b    NMR Posterior pelvic tilt  x 3 mins x 5s holds     Abdominal bracing - with biofeedback    3 mins x 5s holds, cueing to prevent breath holding      Straight leg raise flexion    2x10 b      Straight leg raise abduction  x 10x b      Clamshells  x 3x10 with 5s holds      Seated hip IR  x Green band 3x10      Single leg stance x 3x30s b      Sciatic nerve - slump position      seated: 2 mins x 5s holds each direction, performed b     Steamboats    2x10 b      Bridge + posterior pelvic tilt  x 3 mins  with 5s holds      Steamboats  x 2x10    TA Leg press - seat angle 5       Double le# 3x10   Single le# 3x10  b      Knee extensions    Double le# 3x12     Hamstring curls    Double le# 3x12     Box squats    24 inch 1x10 then discontinued      Side stepping with band    Green band, 4 laps              PLAN Step ups              CPT Codes available for Billing:   (20) minutes of Manual therapy (MT) to improve pain and ROM.  (12) minutes of Therapeutic Exercise (TE) to develop strength, endurance, range of motion, and flexibility.  (23) minutes of Neuromuscular Re-Education (NMR)  to improve: Balance, Coordination, Kinesthetic, Sense, Proprioception, and Posture.--  (00) minutes of Therapeutic Activities (TA) to improve functional performance.  Vasopneumatic Device Therapy () for management of swelling/edema. (43451)  Unattended Electrical Stimulation (ES) for muscle performance or pain modulation.  BFR: Blood flow restriction applied during exercise        Patient Education and Home Exercises       Home Exercises Provided and Patient Education Provided     Education provided: (time included with treatment) minutes  PURPOSE: Patient educated on the impairments noted above and the effects of physical therapy intervention to improve overall condition and QOL.   EXERCISE: Patient was educated on all the above exercise prior/during/after for proper  posture, positioning, and execution for safe performance with home exercise program.   STRENGTH: Patient educated on the importance of improved core and extremity strength in order to improve alignment of the spine and extremities with static positions and dynamic movement.   GAIT & BALANCE: Patient educated on the importance of strong core and lower extremity musculature in order to improve both static and dynamic balance, improve gait mechanics, reduce fall risk and improve household and community mobility.     Written Home Exercises Provided: yes.  Exercises were reviewed and Corie was able to demonstrate them prior to the end of the session.  Corie demonstrated good  understanding of the education provided. See EMR under Patient Instructions for exercises provided during therapy sessions.    Assessment     Patient tolerated session well today.  Again spent significant time with manual interventions which pt reports helps to significantly reduce her symptoms. Incorporated steamboats to further promote single leg stability; pt notes significant increased muscle fatigue at the posterolateral hips.     Corie is progressing well towards her goals.   Patient prognosis is Excellent.     Patient will continue to benefit from skilled outpatient physical therapy to address the deficits listed in the problem list box on initial evaluation, provide pt/family education and to maximize patient's level of independence in the home and community environment.     Patient's spiritual, cultural and educational needs considered and pt agreeable to plan of care and goals.     Anticipated Barriers for therapy: none     Short Term Goals:  4 weeks Status  Date Met   PAIN: Pt will report worst pain of 5/10 in order to progress toward max functional ability and improve quality of life. [x] Progressing  [] Met  [] Not Met     FUNCTION: Patient will demonstrate improved function as indicated by a score of greater than or equal to 61 out of  100 on FOTO. [x] Progressing  [] Met  [] Not Met     MOBILITY: Patient will improve AROM to 50% of stated goals, listed in objective measures above, in order to progress towards independence with functional activities.  [x] Progressing  [] Met  [] Not Met     STRENGTH: Patient will improve strength to 50% of stated goals, listed in objective measures above, in order to progress towards independence with functional activities. [x] Progressing  [] Met  [] Not Met     HEP: Patient will demonstrate independence with HEP in order to progress toward functional independence. [x] Progressing  [] Met  [] Not Met        Long Term Goals:  8 weeks Status Date Met   PAIN: Pt will report worst pain of 2/10 in order to progress toward max functional ability and improve quality of life [x] Progressing  [] Met  [] Not Met     FUNCTION: Patient will demonstrate improved function as indicated by a score of greater than or equal to 70 out of 100 on FOTO. [x] Progressing  [] Met  [] Not Met     MOBILITY: Patient will improve AROM to stated goals, listed in objective measures above, in order to return to maximal functional potential and improve quality of life.  [x] Progressing  [] Met  [] Not Met     STRENGTH: Patient will improve strength to stated goals, listed in objective measures above, in order to improve functional independence and quality of life.  [x] Progressing  [] Met  [] Not Met     GAIT: Patient will demonstrate normalized gait mechanics with minimal compensation in order to return to PLOF. [x] Progressing  [] Met  [] Not Met     Patient will return to normal ADL's, IADL's, community involvement, recreational activities, and work-related activities with less than or equal to 2/10 pain and maximal function.  [x] Progressing  [] Met  [x] Not Met        Plan   Continue Plan of Care (POC) and progress per patient tolerance. See treatment section for details on planned progressions next session.      Yanni Oh  PT

## 2024-11-04 NOTE — TELEPHONE ENCOUNTER
Name: Corie Carreno  Clinic Number: [id number]      Call Date: 10/15/2024    Reason for call: move appt to different day     Message Left:  Corie Carreno was called and a message was left informing patient of opening on different day. Call back number provided at [phone], with recommendations to call as soon as possible.     Plan: follow up with pt

## 2024-11-04 NOTE — PROGRESS NOTES
[facility]   Physical Therapy Treatment Note / POC Update       Name: Corie Carreno  Clinic Number: [id number]    Therapy Diagnosis:   Encounter Diagnoses   Name Primary?    Decreased functional mobility and endurance Yes    Proximal muscle weakness          Physician: Juan Miguel Aj MD    Visit Date: 11/1/2024    Physician Orders: PT Eval and Treat  Medical Diagnosis from Referral: Sciatica, unspecified laterality [M54.30]   Evaluation Date: 8/7/2024  Authorization Period Expiration: 8/7/2025   Plan of Care Expiration: 12/1/2024  Progress Note Due: 12/1/2024  Visit # / Visits authorized: 17/32 (+1 for evaluation)   FOTO: 1/3 (last performed on 8/7/2024)     Precautions: Standard     Time In: 1000  Time Out: 1103  Total Billable Time: 55 minutes (Billing reflects 1 on 1 treatment time spent with patient)    Subjective     Patient reports: She is feeling okay today but is frustrated at continued points of pain and tension in the hips.     He/She was compliant with home exercise program.  Response to previous treatment: significant decrease in pain for ~ 1 day   Functional change: no significant changes noted     Pain: NT/10     Location: R hip to posterior thigh     Objective      Objective Measures updated at progress report or POC update only unless otherwise noted.     RANGE OF MOTION: (* denotes pain)  Lumbar ROM Right  (spine) Left    Dysfunction with Movement Goal   Lumbar Flexion (60º) 100% ---       Lumbar Extension (30º) 75% --- Tightness in the low back  75%   Lumbar Side Bending (25º) 100% 100% Mild pulling and R lateral flank      Lumbar Rotation 75% 75%          Hip AROM/PROM Right Left Dysfunction with Movement Goal   Hip Flexion (120º) 110 100 Body habitus      Hip Extension (30º) 15 15   20   Hip Abduction (45º) 45 45       Hip IR (45º) 35 35   35   Hip ER (45º) 45 45             STRENGTH: (* denotes pain)  L/E MMT Right  (spine) Left Dysfunction with Movement Goal    Modified (90/90) Abdominal Strength    ---       Hip Flexion  4+/5 4+/5 R side feels weaker and like muscles are straining  4+/5 B   Hip Extension  4/5 4/5 Hip pain on R  4+/5 B   Hip Abduction  4/5 4/5   4+/5 B   Knee Extension 5/5 5/5 Hip pain on R  5/5 B   Knee Flexion 4+/5 4+/5 Hip pain on R  5/5 B   Hip IR 4/5 4/5 Hip pain on R  4+/5 B   Hip ER 4+/5 4+/5   4+/5 B   Ankle DF 5/5 5/5   5/5 B   Ankle PF 4/5 4/5   5/5 B         SPECIAL TESTS:     Right  (spine) Left  Goal   Lumbar Compression Negative --- Negative B    SLUMP Negative Negative Negative B    Straight Leg Raise Negative  Negative Negative B             SENSATION: Intact to Light Touch         PALPATION: Muscles: Increased tone and tenderness to palpation of: right , glutes, piriformis.         POSTURE: Pt presents with postural abnormalities which include: forward head, rounded shoulders , and increased thoracic kyphosis         GAIT ANALYSIS The patient ambulated with the following assistive device: none; the pt presents with the following gait abnormalities: compensated trandelenberg     FUNCTIONAL MOVEMENT PATTERNS  Movement Analysis Notes   Bed Mobility  FUNCTIONAL, NONPAINFUL     Transfers FUNCTIONAL, NONPAINFUL     Sit to Stand FUNCTIONAL, NONPAINFUL  Mild B genu valgus. Mild B knee pain    Squat FUNCTIONAL, NONPAINFUL     Single Leg Squat        Step Down        Calf raises  DYSFUNCTIONAL, NONPAINFUL 10/10 double leg. Decreased vertical displacement          BALANCE:    Right   (seconds) Left  (seconds) Pain/dysfunction Noted Goal   Narrow Base of Support   ---   60+ seconds   Tandem Stance 60+ 60+   60+ seconds   Single Leg Stance 30+ 30+   60+ seconds           Treatment     Terra received the treatments listed below:     CPT Intervention Performed   Today Duration / Intensity   MT Soft tissue mobilization  x R glutes and piriformis      Functional Dry Needling  Palpation Assessment to determine the necessity for  Functional Dry Needling  with electrical stimulation.   See EMR under MEDIA for written consent  provided on 2024   R glutes and piriformis    TE Recumbent bike  x Level 3 for 5 mins      Hamstring stretch - strap x 2x1 min  on R      Single knee to chest -strap   30s      Lower trunk rotations- figure 4  x 2 mins, performed b      Gastrocnemius stretch   2x30s      Soleus stretch    2x30s b    NMR Posterior pelvic tilt  x 3 mins x 5s holds     Abdominal bracing - with biofeedback    3 mins x 5s holds, cueing to prevent breath holding      Straight leg raise flexion    2x10 b      Straight leg raise abduction  x 10x b      Clamshells  x 3x10 with 5s holds      Seated hip IR  x Green band 3x10      Single leg stance x 3x30s b      Sciatic nerve - slump position      seated: 2 mins x 5s holds each direction, performed b     Steamboats    2x10 b      Bridge + posterior pelvic tilt  x 3 mins  with 5s holds              TA Leg press - seat angle 5       Double le# 3x10   Single le# 3x10  b      Knee extensions    Double le# 3x12     Hamstring curls    Double le# 3x12     Box squats    24 inch 1x10 then discontinued      Side stepping with band    Green band, 4 laps              PLAN Step ups              CPT Codes available for Billing:   (20) minutes of Manual therapy (MT) to improve pain and ROM.  (12) minutes of Therapeutic Exercise (TE) to develop strength, endurance, range of motion, and flexibility.  (23) minutes of Neuromuscular Re-Education (NMR)  to improve: Balance, Coordination, Kinesthetic, Sense, Proprioception, and Posture.--  (00) minutes of Therapeutic Activities (TA) to improve functional performance.  Vasopneumatic Device Therapy () for management of swelling/edema. (21503)  Unattended Electrical Stimulation (ES) for muscle performance or pain modulation.  BFR: Blood flow restriction applied during exercise      Patient Education and Home Exercises       Home Exercises Provided and Patient Education  Provided     Education provided: (time included with treatment) minutes  PURPOSE: Patient educated on the impairments noted above and the effects of physical therapy intervention to improve overall condition and QOL.   EXERCISE: Patient was educated on all the above exercise prior/during/after for proper posture, positioning, and execution for safe performance with home exercise program.   STRENGTH: Patient educated on the importance of improved core and extremity strength in order to improve alignment of the spine and extremities with static positions and dynamic movement.   GAIT & BALANCE: Patient educated on the importance of strong core and lower extremity musculature in order to improve both static and dynamic balance, improve gait mechanics, reduce fall risk and improve household and community mobility.     Written Home Exercises Provided: yes.  Exercises were reviewed and Corie was able to demonstrate them prior to the end of the session.  Corie demonstrated good  understanding of the education provided. See EMR under Patient Instructions for exercises provided during therapy sessions.    Assessment     Patient tolerated session well today.  Significant increased depth of pressure applied with soft tissue mobilization; significant improved muscle tension and pain reported following intervention. An assessment was completed today with improvements noted in lumbar ROM, gross lower extremity strength and functional movement patterns compared to prior assessment; please see exam for details. He/she continues to have difficulty with muscle tension and pain in the posterolateral hip with prolonged activities due to continued strength deficits. The above impairments and functional deficits will continue to be addressed over the course of this plan of care. patient was educated on continued progression of PT, expectations for the duration of care, updates on goals set at initial evaluation, and home exercise program.   Patient will continue to benefit from physical therapy in order to further address goals, maximize function and quality of life.     Corie is progressing well towards her goals.   Patient prognosis is Excellent.     Patient will continue to benefit from skilled outpatient physical therapy to address the deficits listed in the problem list box on initial evaluation, provide pt/family education and to maximize patient's level of independence in the home and community environment.     Patient's spiritual, cultural and educational needs considered and pt agreeable to plan of care and goals.     Anticipated Barriers for therapy: none     Short Term Goals:  4 weeks Status  Date Met   PAIN: Pt will report worst pain of 5/10 in order to progress toward max functional ability and improve quality of life. [x] Progressing  [] Met  [] Not Met     FUNCTION: Patient will demonstrate improved function as indicated by a score of greater than or equal to 61 out of 100 on FOTO. [x] Progressing  [] Met  [] Not Met     MOBILITY: Patient will improve AROM to 50% of stated goals, listed in objective measures above, in order to progress towards independence with functional activities.  [x] Progressing  [] Met  [] Not Met     STRENGTH: Patient will improve strength to 50% of stated goals, listed in objective measures above, in order to progress towards independence with functional activities. [x] Progressing  [] Met  [] Not Met     HEP: Patient will demonstrate independence with HEP in order to progress toward functional independence. [x] Progressing  [] Met  [] Not Met        Long Term Goals:  8 weeks Status Date Met   PAIN: Pt will report worst pain of 2/10 in order to progress toward max functional ability and improve quality of life [x] Progressing  [] Met  [] Not Met     FUNCTION: Patient will demonstrate improved function as indicated by a score of greater than or equal to 70 out of 100 on FOTO. [x] Progressing  [] Met  [] Not Met      MOBILITY: Patient will improve AROM to stated goals, listed in objective measures above, in order to return to maximal functional potential and improve quality of life.  [x] Progressing  [] Met  [] Not Met     STRENGTH: Patient will improve strength to stated goals, listed in objective measures above, in order to improve functional independence and quality of life.  [x] Progressing  [] Met  [] Not Met     GAIT: Patient will demonstrate normalized gait mechanics with minimal compensation in order to return to PLOF. [x] Progressing  [] Met  [] Not Met     Patient will return to normal ADL's, IADL's, community involvement, recreational activities, and work-related activities with less than or equal to 2/10 pain and maximal function.  [x] Progressing  [] Met  [x] Not Met        Plan     Updated Plan of care Certification: 11/1/2024 to 12/1/2024.    Outpatient Physical Therapy 2 times weekly for 4 weeks to include any combination of the following interventions: virtual visits, dry needling, modalities, electrical stimulation (IFC, Pre-Mod, Attended with Functional Dry Needling), Cervical/Lumbar Traction, Gait Training, Manual Therapy, Neuromuscular Re-ed, Patient Education, Self Care, Therapeutic Exercise, and Therapeutic Activites       Yanni Oh, PT

## 2024-11-20 ENCOUNTER — CLINICAL SUPPORT (OUTPATIENT)
Dept: REHABILITATION | Facility: HOSPITAL | Age: 54
End: 2024-11-20
Payer: COMMERCIAL

## 2024-11-20 DIAGNOSIS — M62.81 PROXIMAL MUSCLE WEAKNESS: ICD-10-CM

## 2024-11-20 DIAGNOSIS — Z74.09 DECREASED FUNCTIONAL MOBILITY AND ENDURANCE: Primary | ICD-10-CM

## 2024-11-20 PROCEDURE — 97110 THERAPEUTIC EXERCISES: CPT

## 2024-11-20 PROCEDURE — 97112 NEUROMUSCULAR REEDUCATION: CPT

## 2024-11-20 PROCEDURE — 97140 MANUAL THERAPY 1/> REGIONS: CPT

## 2024-11-26 ENCOUNTER — OFFICE VISIT (OUTPATIENT)
Dept: PHYSICAL MEDICINE AND REHAB | Facility: CLINIC | Age: 54
End: 2024-11-26
Payer: COMMERCIAL

## 2024-11-26 DIAGNOSIS — G57.01 PIRIFORMIS SYNDROME, RIGHT: Primary | ICD-10-CM

## 2024-11-26 DIAGNOSIS — M70.71 BURSITIS OF RIGHT HIP, UNSPECIFIED BURSA: ICD-10-CM

## 2024-11-26 PROCEDURE — 99999 PR PBB SHADOW E&M-EST. PATIENT-LVL II: CPT | Mod: PBBFAC,,, | Performed by: PHYSICAL MEDICINE & REHABILITATION

## 2024-11-26 RX ORDER — TIZANIDINE 2 MG/1
2 TABLET ORAL NIGHTLY PRN
Qty: 30 TABLET | Refills: 1 | Status: SHIPPED | OUTPATIENT
Start: 2024-11-26

## 2024-11-26 RX ORDER — KETOROLAC TROMETHAMINE 10 MG/1
10 TABLET, FILM COATED ORAL 2 TIMES DAILY
Qty: 10 TABLET | Refills: 0 | Status: SHIPPED | OUTPATIENT
Start: 2024-11-26 | End: 2024-12-01

## 2024-11-26 NOTE — PROGRESS NOTES
[facility] NOTE  Chief Complaint   Patient presents with    Hip Pain       HPI: This is a 54 y.o.  female being seen in clinic today for repeat TPI for piriformis pain.  She was doing very well after last injections and PT, but helped her son move into his 3rd story apartment.     History obtained from patient    Functional History:  Walking: Not limited  Transfers: Independent  Assistive devices: No  Power mobility: No  Falls: None     Needs help with:  Nothing - all ADLS normal       Past family, medical, social, and surgical history reviewed in chart    Review of Systems:     General- denies lethargy, weight change, fever, chills  Head/neck- denies swallowing difficulties  ENT- denies hearing changes  Cardiovascular-denies chest pain  Pulmonary- denies shortness of breath  GI- denies constipation or bowel incontinence  - denies bladder incontinence  Skin- denies wounds or rashes  Musculoskeletal- denies weakness, + pain  Neurologic- denies numbness and tingling  Psychiatric- denies depressive or psychotic features, denies anxiety  Lymphatic-denies swelling  Endocrine- denies hypoglycemic symptoms/DM history  All other pertinent systems negative     Physical Examination:  General: Well developed, well nourished female, NAD  HEENT:NCAT EOMI bilaterally   Pulmonary:Normal respirations    Spinal Examination: CERVICAL  Active ROM is within normal limits.  Inspection: No deformity of spinal alignment.    Spinal Examination: LUMBAR or THORACIC  Active ROM is within normal limits.  Inspection: No deformity of spinal alignment.  No palpable olisthesis.  Palpation:  Ttp at si joints, very ttp at piriformis on right and ttp at gluteus medius    Bilateral Upper and Lower Extremities:  Pulses are 2+ at radial, bilaterally.  Shoulder/Elbow/Wrist/Hand ROM   Hip/Knee/Ankle ROM wnl  Bilateral Extremities show normal capillary refill.  No signs of cyanosis, rubor, edema, skin changes, or dysvascular changes of appendages.   Nails appear intact.    Neurological Exam:  Cranial Nerves:  II-XII grossly intact    Manual Muscle Testing: (Motor 5=normal)  RIGHT Lower extremity: Hip flexion 5/5, Hip Abduction 5/5, Hip Adduction 5/5, Knee extension 5/5, Knee flexion 5/5, Ankle dorsiflexion 5/5, Extensor hallucis longus 5/5, Ankle plantarflexion 5/5  LEFT Lower extremity:  Hip flexion 5/5, Hip Abduction 5/5,Hip Adduction 5/5, Knee extension 5/5, Knee flexion 5/5, Ankle dorsiflexion 5/5, Extensor hallucis longus 5/5, Ankle plantarflexion 5/5    No focal atrophy is noted of either upper or lower extremity.    Bilateral Reflexes:  No clonus at knee or ankle.    Sensation: tested to light touch  - intact in legs    Gait: Narrow base and good arm swing.      IMPRESSION/PLAN: This is a 54 y.o.  female with right piriformis syndrome, Lumbar DJD/DDD  Discussed in detail for 30 minutes about diagnosis and treatment plan    Cont PT  2. injections again today  3. Ketorolac 10mg BID x5 days and zanaflex 2mg QHS prn  4. Fu prn    Aixa Zamora M.D.  Physical Medicine and Rehab      PROCEDURE NOTE    Diagnosis: Myofascial pain  Procedure: trigger point injections to right piriformis and gluteus medius     Risks and benefits of procedure explained to patient including risks of infection, bleeding, pain, or damage to surrounding tissues. All questions answered. Informed consent obtained prior to proceeding. Areas marked and prepped in sterile fashion. Using a 27g 1.25inch needle, 8cc of 1% lidocaine was injected evenly into the above mentioned muscles. None to minimal bleeding noted. ER and post injection instructions given.    Aixa Zamora M.D.

## 2024-11-27 ENCOUNTER — PATIENT MESSAGE (OUTPATIENT)
Dept: PHYSICAL MEDICINE AND REHAB | Facility: CLINIC | Age: 54
End: 2024-11-27
Payer: COMMERCIAL

## 2024-12-01 NOTE — PROGRESS NOTES
[facility]   Physical Therapy Treatment Note        Name: Corie Carreno  Clinic Number: [id number]    Therapy Diagnosis:   Encounter Diagnoses   Name Primary?    Decreased functional mobility and endurance Yes    Proximal muscle weakness          Physician: Juan Miguel Aj MD    Visit Date: 11/20/2024    Physician Orders: PT Eval and Treat  Medical Diagnosis from Referral: Sciatica, unspecified laterality [M54.30]   Evaluation Date: 8/7/2024  Authorization Period Expiration: 8/7/2025   Plan of Care Expiration: 12/1/2024  Progress Note Due: 12/1/2024  Visit # / Visits authorized: 19/32 (+1 for evaluation)   FOTO: 1/3 (last performed on 8/7/2024)     Precautions: Standard     Time In: 1330  Time Out: 1432  Total Billable Time: 55 minutes (Billing reflects 1 on 1 treatment time spent with patient)    Subjective     Patient reports: She has been feeling great overall with minimal symptoms and feels that she is ready for discharge. Planned to return to Dr. Zamora to get one more series of trigger point injections to make sure her symptoms do not return but it's feeling more hopeful moving forward    He/She was compliant with home exercise program.  Response to previous treatment: significant decrease in pain for ~ 1 day   Functional change: no significant changes noted     Pain: NT/10     Location: R hip to posterior thigh     Objective      Objective Measures updated at progress report or POC update only unless otherwise noted.        Treatment     Corie received the treatments listed below:         CPT Intervention Performed   Today Duration / Intensity   MT Soft tissue mobilization  x R glutes and piriformis      Functional Dry Needling  Palpation Assessment to determine the necessity for  Functional Dry Needling with electrical stimulation.   See EMR under MEDIA for written consent  provided on 9/23/2024   R glutes and piriformis    TE Recumbent bike  x Level 3 for 5 mins      Hamstring  stretch - strap x 2x1 min  on R      Single knee to chest -strap   30s      Lower trunk rotations- figure 4  x 2 mins, performed b      Gastrocnemius stretch   2x30s      Soleus stretch    2x30s b    NMR Posterior pelvic tilt  x 3 mins x 5s holds     Abdominal bracing - with biofeedback    3 mins x 5s holds, cueing to prevent breath holding      Straight leg raise flexion  x 2x10 b      Straight leg raise abduction  x 2x10 b      Side lying clamshells  x 2 mins x 5s holds      Seated hip IR  x Blue band 3x10      Single leg stance x 3x30s b      Sciatic nerve - slump position      seated: 2 mins x 5s holds each direction, performed b     Bridge + posterior pelvic tilt  x 3 mins  with 5s holds     Steamboats  x 2x10    TA Leg press - seat angle 5       Double le# 3x10   Single le# 3x10  b      Knee extensions    Double le# 3x12     Hamstring curls    Double le# 3x12     Box squats    24 inch 1x10 then discontinued      Side stepping with band    Green band, 4 laps              PLAN Step ups              CPT Codes available for Billing:   (20) minutes of Manual therapy (MT) to improve pain and ROM.  (12) minutes of Therapeutic Exercise (TE) to develop strength, endurance, range of motion, and flexibility.  (23) minutes of Neuromuscular Re-Education (NMR)  to improve: Balance, Coordination, Kinesthetic, Sense, Proprioception, and Posture.--  (00) minutes of Therapeutic Activities (TA) to improve functional performance.  Vasopneumatic Device Therapy () for management of swelling/edema. (88995)  Unattended Electrical Stimulation (ES) for muscle performance or pain modulation.  BFR: Blood flow restriction applied during exercise        Patient Education and Home Exercises       Home Exercises Provided and Patient Education Provided     Education provided: (time included with treatment) minutes  PURPOSE: Patient educated on the impairments noted above and the effects of physical therapy intervention  to improve overall condition and QOL.   EXERCISE: Patient was educated on all the above exercise prior/during/after for proper posture, positioning, and execution for safe performance with home exercise program.   STRENGTH: Patient educated on the importance of improved core and extremity strength in order to improve alignment of the spine and extremities with static positions and dynamic movement.   GAIT & BALANCE: Patient educated on the importance of strong core and lower extremity musculature in order to improve both static and dynamic balance, improve gait mechanics, reduce fall risk and improve household and community mobility.     Written Home Exercises Provided: yes.  Exercises were reviewed and Corie was able to demonstrate them prior to the end of the session.  Corie demonstrated good  understanding of the education provided. See EMR under Patient Instructions for exercises provided during therapy sessions.    Assessment     Patient tolerated session well today. Continued previously performed interventions to review discharge home exercises. Patient demonstrates good understanding of all interventions. Significant decreased muscle tension noted in the posterior hips compared to previous sessions.    Corie is progressing well towards her goals.   Patient prognosis is Excellent.     Patient will continue to benefit from skilled outpatient physical therapy to address the deficits listed in the problem list box on initial evaluation, provide pt/family education and to maximize patient's level of independence in the home and community environment.     Patient's spiritual, cultural and educational needs considered and pt agreeable to plan of care and goals.     Anticipated Barriers for therapy: none     Short Term Goals:  4 weeks Status  Date Met   PAIN: Pt will report worst pain of 5/10 in order to progress toward max functional ability and improve quality of life. [x] Progressing  [] Met  [] Not Met      FUNCTION: Patient will demonstrate improved function as indicated by a score of greater than or equal to 61 out of 100 on FOTO. [x] Progressing  [] Met  [] Not Met     MOBILITY: Patient will improve AROM to 50% of stated goals, listed in objective measures above, in order to progress towards independence with functional activities.  [x] Progressing  [] Met  [] Not Met     STRENGTH: Patient will improve strength to 50% of stated goals, listed in objective measures above, in order to progress towards independence with functional activities. [x] Progressing  [] Met  [] Not Met     HEP: Patient will demonstrate independence with HEP in order to progress toward functional independence. [x] Progressing  [] Met  [] Not Met        Long Term Goals:  8 weeks Status Date Met   PAIN: Pt will report worst pain of 2/10 in order to progress toward max functional ability and improve quality of life [x] Progressing  [] Met  [] Not Met     FUNCTION: Patient will demonstrate improved function as indicated by a score of greater than or equal to 70 out of 100 on FOTO. [x] Progressing  [] Met  [] Not Met     MOBILITY: Patient will improve AROM to stated goals, listed in objective measures above, in order to return to maximal functional potential and improve quality of life.  [x] Progressing  [] Met  [] Not Met     STRENGTH: Patient will improve strength to stated goals, listed in objective measures above, in order to improve functional independence and quality of life.  [x] Progressing  [] Met  [] Not Met     GAIT: Patient will demonstrate normalized gait mechanics with minimal compensation in order to return to PLOF. [x] Progressing  [] Met  [] Not Met     Patient will return to normal ADL's, IADL's, community involvement, recreational activities, and work-related activities with less than or equal to 2/10 pain and maximal function.  [x] Progressing  [] Met  [x] Not Met        Plan   Continue Plan of Care (POC) and progress per  patient tolerance. See treatment section for details on planned progressions next session.      Yanni Oh, PT

## 2024-12-02 RX ORDER — METHYLPREDNISOLONE 4 MG/1
TABLET ORAL
Qty: 1 EACH | Refills: 0 | Status: SHIPPED | OUTPATIENT
Start: 2024-12-02 | End: 2024-12-23

## 2024-12-10 ENCOUNTER — PATIENT MESSAGE (OUTPATIENT)
Dept: PHYSICAL MEDICINE AND REHAB | Facility: CLINIC | Age: 54
End: 2024-12-10
Payer: COMMERCIAL

## 2024-12-13 ENCOUNTER — CLINICAL SUPPORT (OUTPATIENT)
Dept: REHABILITATION | Facility: HOSPITAL | Age: 54
End: 2024-12-13
Payer: COMMERCIAL

## 2024-12-13 DIAGNOSIS — M70.71 BURSITIS OF RIGHT HIP, UNSPECIFIED BURSA: ICD-10-CM

## 2024-12-13 DIAGNOSIS — M62.81 PROXIMAL MUSCLE WEAKNESS: ICD-10-CM

## 2024-12-13 DIAGNOSIS — G57.01 PIRIFORMIS SYNDROME, RIGHT: Primary | ICD-10-CM

## 2024-12-13 DIAGNOSIS — Z74.09 DECREASED FUNCTIONAL MOBILITY AND ENDURANCE: Primary | ICD-10-CM

## 2024-12-13 PROCEDURE — 97140 MANUAL THERAPY 1/> REGIONS: CPT

## 2024-12-13 PROCEDURE — 97112 NEUROMUSCULAR REEDUCATION: CPT

## 2024-12-13 PROCEDURE — 97110 THERAPEUTIC EXERCISES: CPT

## 2024-12-20 ENCOUNTER — CLINICAL SUPPORT (OUTPATIENT)
Dept: REHABILITATION | Facility: HOSPITAL | Age: 54
End: 2024-12-20
Payer: COMMERCIAL

## 2024-12-20 DIAGNOSIS — G57.01 PIRIFORMIS SYNDROME, RIGHT: ICD-10-CM

## 2024-12-20 DIAGNOSIS — M62.81 PROXIMAL MUSCLE WEAKNESS: ICD-10-CM

## 2024-12-20 DIAGNOSIS — Z74.09 DECREASED FUNCTIONAL MOBILITY AND ENDURANCE: Primary | ICD-10-CM

## 2024-12-20 DIAGNOSIS — M70.71 BURSITIS OF RIGHT HIP, UNSPECIFIED BURSA: ICD-10-CM

## 2024-12-20 PROCEDURE — 97140 MANUAL THERAPY 1/> REGIONS: CPT

## 2024-12-20 PROCEDURE — 97110 THERAPEUTIC EXERCISES: CPT

## 2024-12-20 PROCEDURE — 97112 NEUROMUSCULAR REEDUCATION: CPT

## 2024-12-23 ENCOUNTER — CLINICAL SUPPORT (OUTPATIENT)
Dept: REHABILITATION | Facility: HOSPITAL | Age: 54
End: 2024-12-23
Payer: COMMERCIAL

## 2024-12-23 ENCOUNTER — TELEPHONE (OUTPATIENT)
Dept: REHABILITATION | Facility: HOSPITAL | Age: 54
End: 2024-12-23
Payer: COMMERCIAL

## 2024-12-23 DIAGNOSIS — Z74.09 DECREASED FUNCTIONAL MOBILITY AND ENDURANCE: Primary | ICD-10-CM

## 2024-12-23 DIAGNOSIS — M62.81 PROXIMAL MUSCLE WEAKNESS: ICD-10-CM

## 2024-12-23 PROCEDURE — 97140 MANUAL THERAPY 1/> REGIONS: CPT

## 2024-12-23 PROCEDURE — 97110 THERAPEUTIC EXERCISES: CPT

## 2024-12-23 PROCEDURE — 97112 NEUROMUSCULAR REEDUCATION: CPT

## 2024-12-23 PROCEDURE — 97530 THERAPEUTIC ACTIVITIES: CPT

## 2024-12-23 NOTE — TELEPHONE ENCOUNTER
Name: Corie Carreno  Clinic Number: [id number]      Call Date: 12/23/2024    Reason for call: earlier appt time available     Discussion: Corie Carreno was reached via phone number, and stated that she can take the earlier appt time available at 4:30. Will call back if another appt is available at an even earlier time.      Follow up Visit's:     Plan: see pt at 4:30, unless earlier time becomes available

## 2024-12-23 NOTE — TELEPHONE ENCOUNTER
Name: Corie Carreno  Clinic Number: [id number]      Call Date: 12/23/2024    Reason for call: had an opening for earlier appointment    Discussion: Corie Carreno did not answer; left voicemail

## 2024-12-30 ENCOUNTER — CLINICAL SUPPORT (OUTPATIENT)
Dept: REHABILITATION | Facility: HOSPITAL | Age: 54
End: 2024-12-30
Payer: COMMERCIAL

## 2024-12-30 DIAGNOSIS — M62.81 PROXIMAL MUSCLE WEAKNESS: ICD-10-CM

## 2024-12-30 DIAGNOSIS — Z74.09 DECREASED FUNCTIONAL MOBILITY AND ENDURANCE: Primary | ICD-10-CM

## 2024-12-30 PROCEDURE — 97112 NEUROMUSCULAR REEDUCATION: CPT

## 2024-12-30 PROCEDURE — 97140 MANUAL THERAPY 1/> REGIONS: CPT

## 2024-12-30 PROCEDURE — 97530 THERAPEUTIC ACTIVITIES: CPT

## 2024-12-30 PROCEDURE — 97110 THERAPEUTIC EXERCISES: CPT

## 2024-12-31 NOTE — PROGRESS NOTES
[facility]   Physical Therapy Treatment Note        Name: Corie Carreno  Clinic Number: [id number]    Therapy Diagnosis:   Encounter Diagnoses   Name Primary?    Decreased functional mobility and endurance Yes    Proximal muscle weakness          Physician: Juan Miguel Aj MD    Visit Date: 12/23/2024    Physician Orders: PT Eval and Treat  Medical Diagnosis from Referral: Sciatica, unspecified laterality [M54.30]   Evaluation Date: 8/7/2024  Authorization Period Expiration: 8/7/2025   Plan of Care Expiration: 2/13/2025  Progress Note Due: 1/13/2025  Visit # / Visits authorized: 22/32 (+1 for evaluation)   FOTO: 1/3 (last performed on 8/7/2024)     Precautions: Standard     Time In: 1630  Time Out: 1734  Total Billable Time: 55 minutes (Billing reflects 1 on 1 treatment time spent with patient)    Subjective     Patient reports: she continues to have some tension in the hip but feels much better overall     He/She was compliant with home exercise program.  Response to previous treatment: significant decrease in pain for ~ 1 day   Functional change: no significant changes noted     Pain: NT/10     Location: R hip to posterior thigh     Objective      Objective Measures updated at progress report or POC update only unless otherwise noted.        Treatment     Corie received the treatments listed below:         CPT Intervention Performed   Today Duration / Intensity   MT Soft tissue mobilization   R glutes and piriformis      Functional Dry Needling  Palpation Assessment to determine the necessity for  Functional Dry Needling with electrical stimulation.   See EMR under MEDIA for written consent  provided on 9/23/2024  x R glutes and piriformis    TE Recumbent bike  x Level 3 for 5 mins      Hamstring stretch - strap  2x1 min  on R      Single knee to chest -strap   30s      Lower trunk rotations- figure 4  x 2 mins, performed b      Gastrocnemius stretch   2x30s      Soleus stretch    2x30s  b    NMR Posterior pelvic tilt   3 mins x 5s holds     Abdominal bracing - with biofeedback    3 mins x 5s holds, cueing to prevent breath holding      Straight leg raise flexion   2x10 b      Straight leg raise abduction   2x10 b      Side lying clamshells  x 2 mins x 5s holds      Seated hip IR   Blue band 3x10      Single leg stance x 3x30s b      Sciatic nerve - slump position     seated: 2 mins x 5s holds each direction, performed b     Bridge + posterior pelvic tilt  x 3 mins  with 5s holds     Steamboats   2x10    TA Leg press - seat angle 5   x    Double le# 3x10   Single le# 3x10  b      Knee extensions    Double le# 3x12     Hamstring curls    Double le# 3x12     Box squats    24 inch 1x10 then discontinued      Side stepping with band   x Green band, 4 laps      Step ups x  4 inch 3x10 B     PLAN Step ups              CPT Codes available for Billing:   (20) minutes of Manual therapy (MT) to improve pain and ROM.  (09) minutes of Therapeutic Exercise (TE) to develop strength, endurance, range of motion, and flexibility.  (14) minutes of Neuromuscular Re-Education (NMR)  to improve: Balance, Coordination, Kinesthetic, Sense, Proprioception, and Posture.--  (12) minutes of Therapeutic Activities (TA) to improve functional performance.  Vasopneumatic Device Therapy () for management of swelling/edema. (61245)  Unattended Electrical Stimulation (ES) for muscle performance or pain modulation.  BFR: Blood flow restriction applied during exercise        Patient Education and Home Exercises       Home Exercises Provided and Patient Education Provided     Education provided: (time included with treatment) minutes  PURPOSE: Patient educated on the impairments noted above and the effects of physical therapy intervention to improve overall condition and QOL.   EXERCISE: Patient was educated on all the above exercise prior/during/after for proper posture, positioning, and execution for safe  performance with home exercise program.   STRENGTH: Patient educated on the importance of improved core and extremity strength in order to improve alignment of the spine and extremities with static positions and dynamic movement.   GAIT & BALANCE: Patient educated on the importance of strong core and lower extremity musculature in order to improve both static and dynamic balance, improve gait mechanics, reduce fall risk and improve household and community mobility.     Written Home Exercises Provided: yes.  Exercises were reviewed and Corie was able to demonstrate them prior to the end of the session.  Corie demonstrated good  understanding of the education provided. See EMR under Patient Instructions for exercises provided during therapy sessions.    Assessment     Pt tolerated session well today. It was determined that this patient would benefit from the use of functional dry needling after being cleared for all contraindications. Verbal and written consent obtained. Patient demonstrated appropriate response to Functional Dry Needling with good rhythmical contractions observed with estim to treated muscle groups. Added leg press and side stepping to further progress functional lower extremity strength with emphasis on glute activation     Corie is progressing well towards her goals.   Patient prognosis is Excellent.     Patient will continue to benefit from skilled outpatient physical therapy to address the deficits listed in the problem list box on initial evaluation, provide pt/family education and to maximize patient's level of independence in the home and community environment.     Patient's spiritual, cultural and educational needs considered and pt agreeable to plan of care and goals.     Anticipated Barriers for therapy: none     Short Term Goals:  4 weeks Status  Date Met   PAIN: Pt will report worst pain of 5/10 in order to progress toward max functional ability and improve quality of life. [x]  Progressing  [] Met  [] Not Met     FUNCTION: Patient will demonstrate improved function as indicated by a score of greater than or equal to 61 out of 100 on FOTO. [x] Progressing  [] Met  [] Not Met     MOBILITY: Patient will improve AROM to 50% of stated goals, listed in objective measures above, in order to progress towards independence with functional activities.  [x] Progressing  [] Met  [] Not Met     STRENGTH: Patient will improve strength to 50% of stated goals, listed in objective measures above, in order to progress towards independence with functional activities. [x] Progressing  [] Met  [] Not Met     HEP: Patient will demonstrate independence with HEP in order to progress toward functional independence. [x] Progressing  [] Met  [] Not Met        Long Term Goals:  8 weeks Status Date Met   PAIN: Pt will report worst pain of 2/10 in order to progress toward max functional ability and improve quality of life [x] Progressing  [] Met  [] Not Met     FUNCTION: Patient will demonstrate improved function as indicated by a score of greater than or equal to 70 out of 100 on FOTO. [x] Progressing  [] Met  [] Not Met     MOBILITY: Patient will improve AROM to stated goals, listed in objective measures above, in order to return to maximal functional potential and improve quality of life.  [x] Progressing  [] Met  [] Not Met     STRENGTH: Patient will improve strength to stated goals, listed in objective measures above, in order to improve functional independence and quality of life.  [x] Progressing  [] Met  [] Not Met     GAIT: Patient will demonstrate normalized gait mechanics with minimal compensation in order to return to PLOF. [x] Progressing  [] Met  [] Not Met     Patient will return to normal ADL's, IADL's, community involvement, recreational activities, and work-related activities with less than or equal to 2/10 pain and maximal function.  [x] Progressing  [] Met  [x] Not Met        Plan   Continue Plan of  Care (POC) and progress per patient tolerance. See treatment section for details on planned progressions next session.      Yanni Oh PT

## 2024-12-31 NOTE — PROGRESS NOTES
[facility]   Physical Therapy Treatment Note / POC Update       Name: Corie Carreno  Clinic Number: [id number]    Therapy Diagnosis:   Encounter Diagnoses   Name Primary?    Decreased functional mobility and endurance Yes    Proximal muscle weakness          Physician: Juan Miguel Aj MD    Visit Date: 12/13/2024    Physician Orders: PT Eval and Treat  Medical Diagnosis from Referral: Sciatica, unspecified laterality [M54.30]   Evaluation Date: 8/7/2024  Authorization Period Expiration: 8/7/2025   Plan of Care Expiration: 2/13/2025  Progress Note Due: 1/13/2025  Visit # / Visits authorized: 20/32 (+1 for evaluation)   FOTO: 1/3 (last performed on 8/7/2024)     Precautions: Standard     Time In: 1330  Time Out: 1435  Total Billable Time: 55 minutes (Billing reflects 1 on 1 treatment time spent with patient)    Subjective     Patient reports: she was feeling great following her previous session; however, she had to help move her son out of his third floor apartments and re-injured her back from going up and down the stairs repeatedly. She has continued all of her exercises but is not able to get relief     He/She was compliant with home exercise program.  Response to previous treatment: significant decrease in pain for ~ 1 day   Functional change: no significant changes noted     Pain: NT/10     Location: R hip to posterior thigh     Objective      Objective Measures updated at progress report or POC update only unless otherwise noted.      RANGE OF MOTION: (* denotes pain)  Lumbar ROM Right  (spine) Left    Dysfunction with Movement Goal   Lumbar Flexion (60º) 75% ---       Lumbar Extension (30º) 75% --- Tightness in the low back  75%   Lumbar Side Bending (25º) 75% 75% Mild pulling and R lateral flank      Lumbar Rotation 75% 75%          Hip AROM/PROM Right Left Dysfunction with Movement Goal   Hip Flexion (120º) 110 100 Body habitus      Hip Extension (30º) 15 15   20   Hip Abduction  (45º) 45 45       Hip IR (45º) 35 35   35   Hip ER (45º) 45 45             STRENGTH: (* denotes pain)  L/E MMT Right  (spine) Left Dysfunction with Movement Goal   Modified (90/90) Abdominal Strength    ---       Hip Flexion  4/5 4+/5 R side feels weaker and like muscles are straining  4+/5 B   Hip Extension  4-/5 4/5 Hip pain on R  4+/5 B   Hip Abduction  4-/5 4/5   4+/5 B   Knee Extension 5/5 5/5 Hip pain on R  5/5 B   Knee Flexion 4+/5 4+/5 Hip pain on R  5/5 B   Hip IR 4-/5 4/5 Hip pain on R  4+/5 B   Hip ER 4/5 4+/5   4+/5 B   Ankle DF 5/5 5/5   5/5 B   Ankle PF 4/5 4/5   5/5 B         SPECIAL TESTS:     Right  (spine) Left  Goal   Lumbar Compression Negative --- Negative B    SLUMP Negative Negative Negative B    Straight Leg Raise Negative  Negative Negative B             SENSATION: Intact to Light Touch         PALPATION: Muscles: Increased tone and tenderness to palpation of: right , glutes, piriformis.         POSTURE: Pt presents with postural abnormalities which include: forward head, rounded shoulders , and increased thoracic kyphosis         GAIT ANALYSIS The patient ambulated with the following assistive device: none; the pt presents with the following gait abnormalities: compensated trandelenberg     FUNCTIONAL MOVEMENT PATTERNS  Movement Analysis Notes   Bed Mobility  FUNCTIONAL, NONPAINFUL     Transfers FUNCTIONAL, NONPAINFUL     Sit to Stand DYSFUNCTIONAL, PAINFUL    Squat DYSFUNCTIONAL, PAINFUL     Single Leg Squat        Step Down        Calf raises  DYSFUNCTIONAL, NONPAINFUL 10/10 double leg. Decreased vertical displacement          BALANCE:    Right   (seconds) Left  (seconds) Pain/dysfunction Noted Goal   Narrow Base of Support   ---   60+ seconds   Tandem Stance 60+ 60+   60+ seconds   Single Leg Stance 30+ 30+   60+ seconds           Treatment     Terra received the treatments listed below:         CPT Intervention Performed   Today Duration / Intensity   MT Soft tissue mobilization  x R  glutes and piriformis      Functional Dry Needling  Palpation Assessment to determine the necessity for  Functional Dry Needling with electrical stimulation.   See EMR under MEDIA for written consent  provided on 2024  x R glutes and piriformis    TE Recumbent bike  x Level 3 for 5 mins      Hamstring stretch - strap x 2x1 min  on R      Single knee to chest -strap   30s      Lower trunk rotations- figure 4  x 2 mins, performed b      Gastrocnemius stretch   2x30s      Soleus stretch    2x30s b    NMR Posterior pelvic tilt  x 3 mins x 5s holds     Abdominal bracing - with biofeedback    3 mins x 5s holds, cueing to prevent breath holding      Straight leg raise flexion   2x10 b      Straight leg raise abduction   2x10 b      Side lying clamshells  x 2 mins x 5s holds      Seated hip IR  x Blue band 3x10      Single leg stance  3x30s b      Sciatic nerve - slump position x     seated: 2 mins x 5s holds each direction, performed b     Bridge + posterior pelvic tilt  x 3 mins  with 5s holds     Steamboats   2x10    TA Leg press - seat angle 5       Double le# 3x10   Single le# 3x10  b      Knee extensions    Double le# 3x12     Hamstring curls    Double le# 3x12     Box squats    24 inch 1x10 then discontinued      Side stepping with band    Green band, 4 laps              PLAN Step ups              CPT Codes available for Billing:   (25) minutes of Manual therapy (MT) to improve pain and ROM.  (12) minutes of Therapeutic Exercise (TE) to develop strength, endurance, range of motion, and flexibility.  (18) minutes of Neuromuscular Re-Education (NMR)  to improve: Balance, Coordination, Kinesthetic, Sense, Proprioception, and Posture.--  (00) minutes of Therapeutic Activities (TA) to improve functional performance.  Vasopneumatic Device Therapy () for management of swelling/edema. (33823)  Unattended Electrical Stimulation (ES) for muscle performance or pain modulation.  BFR: Blood flow  restriction applied during exercise        Patient Education and Home Exercises       Home Exercises Provided and Patient Education Provided     Education provided: (time included with treatment) minutes  PURPOSE: Patient educated on the impairments noted above and the effects of physical therapy intervention to improve overall condition and QOL.   EXERCISE: Patient was educated on all the above exercise prior/during/after for proper posture, positioning, and execution for safe performance with home exercise program.   STRENGTH: Patient educated on the importance of improved core and extremity strength in order to improve alignment of the spine and extremities with static positions and dynamic movement.   GAIT & BALANCE: Patient educated on the importance of strong core and lower extremity musculature in order to improve both static and dynamic balance, improve gait mechanics, reduce fall risk and improve household and community mobility.     Written Home Exercises Provided: yes.  Exercises were reviewed and Corie was able to demonstrate them prior to the end of the session.  Corie demonstrated good  understanding of the education provided. See EMR under Patient Instructions for exercises provided during therapy sessions.    Assessment     Patient tolerated session well today. It was determined that this patient would benefit from the use of functional dry needling after being cleared for all contraindications. Verbal and written consent obtained. Patient demonstrated appropriate response to Functional Dry Needling with good  rhythmical contractions observed with estim to treated muscle groups. Significant time spent on manual interventions and stretching today with significant relief noted following intervention. An assessment was completed today with regression noted in ROM and gross lower extremity strengt  compared to prior assessment following recent irritation of pain after helping her son move out of his  appartment; please see exam for details. The above impairments and functional deficits will continue to be addressed over the course of this plan of care. patient was educated on continued progression of PT, expectations for the duration of care, updates on goals set at initial evaluation, and home exercise program.  Patient will continue to benefit from physical therapy in order to further address goals, maximize function and quality of life.     Corie is progressing well towards her goals.   Patient prognosis is Excellent.     Patient will continue to benefit from skilled outpatient physical therapy to address the deficits listed in the problem list box on initial evaluation, provide pt/family education and to maximize patient's level of independence in the home and community environment.     Patient's spiritual, cultural and educational needs considered and pt agreeable to plan of care and goals.     Anticipated Barriers for therapy: none     Short Term Goals:  4 weeks Status  Date Met   PAIN: Pt will report worst pain of 5/10 in order to progress toward max functional ability and improve quality of life. [x] Progressing  [] Met  [] Not Met     FUNCTION: Patient will demonstrate improved function as indicated by a score of greater than or equal to 61 out of 100 on FOTO. [x] Progressing  [] Met  [] Not Met     MOBILITY: Patient will improve AROM to 50% of stated goals, listed in objective measures above, in order to progress towards independence with functional activities.  [x] Progressing  [] Met  [] Not Met     STRENGTH: Patient will improve strength to 50% of stated goals, listed in objective measures above, in order to progress towards independence with functional activities. [x] Progressing  [] Met  [] Not Met     HEP: Patient will demonstrate independence with HEP in order to progress toward functional independence. [x] Progressing  [] Met  [] Not Met        Long Term Goals:  8 weeks Status Date Met   PAIN:  Pt will report worst pain of 2/10 in order to progress toward max functional ability and improve quality of life [x] Progressing  [] Met  [] Not Met     FUNCTION: Patient will demonstrate improved function as indicated by a score of greater than or equal to 70 out of 100 on FOTO. [x] Progressing  [] Met  [] Not Met     MOBILITY: Patient will improve AROM to stated goals, listed in objective measures above, in order to return to maximal functional potential and improve quality of life.  [x] Progressing  [] Met  [] Not Met     STRENGTH: Patient will improve strength to stated goals, listed in objective measures above, in order to improve functional independence and quality of life.  [x] Progressing  [] Met  [] Not Met     GAIT: Patient will demonstrate normalized gait mechanics with minimal compensation in order to return to PLOF. [x] Progressing  [] Met  [] Not Met     Patient will return to normal ADL's, IADL's, community involvement, recreational activities, and work-related activities with less than or equal to 2/10 pain and maximal function.  [x] Progressing  [] Met  [x] Not Met        Plan   Updated Plan of care Certification: 12/13/2024 to 2/13/2025.    Outpatient Physical Therapy 2 times weekly for 8 weeks to include any combination of the following interventions: virtual visits, dry needling, modalities, electrical stimulation (IFC, Pre-Mod, Attended with Functional Dry Needling), Cervical/Lumbar Traction, Gait Training, Manual Therapy, Neuromuscular Re-ed, Patient Education, Self Care, Therapeutic Exercise, and Therapeutic Activites       Yanni Oh, PT

## 2024-12-31 NOTE — PROGRESS NOTES
[facility]   Physical Therapy Treatment Note        Name: Corie Carreno  Clinic Number: [id number]    Therapy Diagnosis:   Encounter Diagnoses   Name Primary?    Piriformis syndrome, right     Bursitis of right hip, unspecified bursa     Decreased functional mobility and endurance Yes    Proximal muscle weakness          Physician: Juan Miguel Aj MD    Visit Date: 12/20/2024    Physician Orders: PT Eval and Treat  Medical Diagnosis from Referral: Sciatica, unspecified laterality [M54.30]   Evaluation Date: 8/7/2024  Authorization Period Expiration: 8/7/2025   Plan of Care Expiration: 2/13/2025  Progress Note Due: 1/13/2025  Visit # / Visits authorized: 21/32 (+1 for evaluation)   FOTO: 1/3 (last performed on 8/7/2024)     Precautions: Standard     Time In: 1100  Time Out: 1159  Total Billable Time: 56 minutes (Billing reflects 1 on 1 treatment time spent with patient)    Subjective     Patient reports: she is feeling much better overall and noted significant improvement following functional dry needling performed previous session    He/She was compliant with home exercise program.  Response to previous treatment: significant decrease in pain for ~ 1 day   Functional change: no significant changes noted     Pain: NT/10     Location: R hip to posterior thigh     Objective      Objective Measures updated at progress report or POC update only unless otherwise noted.        Treatment     Corie received the treatments listed below:         CPT Intervention Performed   Today Duration / Intensity   MT Soft tissue mobilization  x R glutes and piriformis      Functional Dry Needling  Palpation Assessment to determine the necessity for  Functional Dry Needling with electrical stimulation.   See EMR under MEDIA for written consent  provided on 9/23/2024   R glutes and piriformis    TE Recumbent bike  x Level 3 for 5 mins      Hamstring stretch - strap x 2x1 min  on R      Single knee to chest -strap    30s      Lower trunk rotations- figure 4  x 2 mins, performed b      Gastrocnemius stretch   2x30s      Soleus stretch    2x30s b    NMR Posterior pelvic tilt  x 3 mins x 5s holds     Abdominal bracing - with biofeedback    3 mins x 5s holds, cueing to prevent breath holding      Straight leg raise flexion   2x10 b      Straight leg raise abduction   2x10 b      Side lying clamshells  x 2 mins x 5s holds      Seated hip IR   Blue band 3x10      Single leg stance x 3x30s b      Sciatic nerve - slump position x    seated: 2 mins x 5s holds each direction, performed b     Bridge + posterior pelvic tilt  x 3 mins  with 5s holds     Steamboats   2x10    TA Leg press - seat angle 5       Double le# 3x10   Single le# 3x10  b      Knee extensions    Double le# 3x12     Hamstring curls    Double le# 3x12     Box squats    24 inch 1x10 then discontinued      Side stepping with band    Green band, 4 laps      Step ups x  4 inch 3x10 B     PLAN Step ups              CPT Codes available for Billing:   (15) minutes of Manual therapy (MT) to improve pain and ROM.  (12) minutes of Therapeutic Exercise (TE) to develop strength, endurance, range of motion, and flexibility.  (23) minutes of Neuromuscular Re-Education (NMR)  to improve: Balance, Coordination, Kinesthetic, Sense, Proprioception, and Posture.--  (06) minutes of Therapeutic Activities (TA) to improve functional performance.  Vasopneumatic Device Therapy () for management of swelling/edema. (84991)  Unattended Electrical Stimulation (ES) for muscle performance or pain modulation.  BFR: Blood flow restriction applied during exercise        Patient Education and Home Exercises       Home Exercises Provided and Patient Education Provided     Education provided: (time included with treatment) minutes  PURPOSE: Patient educated on the impairments noted above and the effects of physical therapy intervention to improve overall condition and QOL.   EXERCISE:  Patient was educated on all the above exercise prior/during/after for proper posture, positioning, and execution for safe performance with home exercise program.   STRENGTH: Patient educated on the importance of improved core and extremity strength in order to improve alignment of the spine and extremities with static positions and dynamic movement.   GAIT & BALANCE: Patient educated on the importance of strong core and lower extremity musculature in order to improve both static and dynamic balance, improve gait mechanics, reduce fall risk and improve household and community mobility.     Written Home Exercises Provided: yes.  Exercises were reviewed and Corie was able to demonstrate them prior to the end of the session.  Corie demonstrated good  understanding of the education provided. See EMR under Patient Instructions for exercises provided during therapy sessions.    Assessment     Pt tolerated session well today. Incorporated step ups to improve functional strength with activities such as ascending stairs; no adverse symptoms noted with intervention     Corie is progressing well towards her goals.   Patient prognosis is Excellent.     Patient will continue to benefit from skilled outpatient physical therapy to address the deficits listed in the problem list box on initial evaluation, provide pt/family education and to maximize patient's level of independence in the home and community environment.     Patient's spiritual, cultural and educational needs considered and pt agreeable to plan of care and goals.     Anticipated Barriers for therapy: none     Short Term Goals:  4 weeks Status  Date Met   PAIN: Pt will report worst pain of 5/10 in order to progress toward max functional ability and improve quality of life. [x] Progressing  [] Met  [] Not Met     FUNCTION: Patient will demonstrate improved function as indicated by a score of greater than or equal to 61 out of 100 on FOTO. [x] Progressing  [] Met  [] Not  Met     MOBILITY: Patient will improve AROM to 50% of stated goals, listed in objective measures above, in order to progress towards independence with functional activities.  [x] Progressing  [] Met  [] Not Met     STRENGTH: Patient will improve strength to 50% of stated goals, listed in objective measures above, in order to progress towards independence with functional activities. [x] Progressing  [] Met  [] Not Met     HEP: Patient will demonstrate independence with HEP in order to progress toward functional independence. [x] Progressing  [] Met  [] Not Met        Long Term Goals:  8 weeks Status Date Met   PAIN: Pt will report worst pain of 2/10 in order to progress toward max functional ability and improve quality of life [x] Progressing  [] Met  [] Not Met     FUNCTION: Patient will demonstrate improved function as indicated by a score of greater than or equal to 70 out of 100 on FOTO. [x] Progressing  [] Met  [] Not Met     MOBILITY: Patient will improve AROM to stated goals, listed in objective measures above, in order to return to maximal functional potential and improve quality of life.  [x] Progressing  [] Met  [] Not Met     STRENGTH: Patient will improve strength to stated goals, listed in objective measures above, in order to improve functional independence and quality of life.  [x] Progressing  [] Met  [] Not Met     GAIT: Patient will demonstrate normalized gait mechanics with minimal compensation in order to return to PLOF. [x] Progressing  [] Met  [] Not Met     Patient will return to normal ADL's, IADL's, community involvement, recreational activities, and work-related activities with less than or equal to 2/10 pain and maximal function.  [x] Progressing  [] Met  [x] Not Met        Plan   Continue Plan of Care (POC) and progress per patient tolerance. See treatment section for details on planned progressions next session.      Yanni Oh, PT

## 2025-01-01 NOTE — PROGRESS NOTES
[facility]   Physical Therapy Treatment Note        Name: Corie Carreno  Clinic Number: [id number]    Therapy Diagnosis:   Encounter Diagnoses   Name Primary?    Decreased functional mobility and endurance Yes    Proximal muscle weakness          Physician: Juan Miguel Aj MD    Visit Date: 12/30/2024    Physician Orders: PT Eval and Treat  Medical Diagnosis from Referral: Sciatica, unspecified laterality [M54.30]   Evaluation Date: 8/7/2024  Authorization Period Expiration: 8/7/2025   Plan of Care Expiration: 2/13/2025  Progress Note Due: 1/13/2025  Visit # / Visits authorized: 23/32 (+1 for evaluation)   FOTO: 1/3 (last performed on 8/7/2024)     Precautions: Standard     Time In: 1330  Time Out: 1432  Total Billable Time: 55 minutes (Billing reflects 1 on 1 treatment time spent with patient)    Subjective     Patient reports: She is feeling much better overall today.     He/She was compliant with home exercise program.  Response to previous treatment: significant decrease in pain for ~ 1 day   Functional change: no significant changes noted     Pain: NT/10     Location: R hip to posterior thigh     Objective      Objective Measures updated at progress report or POC update only unless otherwise noted.        Treatment     Corie received the treatments listed below:          CPT Intervention Performed   Today Duration / Intensity   MT Soft tissue mobilization  x R glutes and piriformis      Functional Dry Needling  Palpation Assessment to determine the necessity for  Functional Dry Needling with electrical stimulation.   See EMR under MEDIA for written consent  provided on 9/23/2024   R glutes and piriformis    TE Recumbent bike  x Level 3 for 5 mins      Hamstring stretch - strap   2x1 min  on R      Single knee to chest -strap   30s      Lower trunk rotations- figure 4  x 2 mins, performed b      Gastrocnemius stretch   2x30s      Soleus stretch    2x30s b    NMR Posterior pelvic tilt     3 mins x 5s holds     Abdominal bracing - with biofeedback    3 mins x 5s holds, cueing to prevent breath holding      Straight leg raise flexion    2x10 b      Straight leg raise abduction  x 1x10 b      Side lying clamshells  x 2 mins x 5s holds      Seated hip IR    Blue band 3x10      Single leg stance x 3x30s b      Sciatic nerve - slump position      seated: 2 mins x 5s holds each direction, performed b     Bridge + posterior pelvic tilt  x 3 mins  with 5s holds     Steamboats    2x10    TA Leg press - seat angle 5   x  x    Double le# 3x10   Single le# 1x10 b                     65# 2x10  b      Step ups:  x 6 inch, 2x10 b      Knee extensions    Double le# 3x12     Hamstring curls    Double le# 3x12     Box squats    24 inch 1x10 then discontinued      Side stepping with band   x Red band, 4 laps along mirror    PLAN Step ups              CPT Codes available for Billing:   (10) minutes of Manual therapy (MT) to improve pain and ROM.  (09) minutes of Therapeutic Exercise (TE) to develop strength, endurance, range of motion, and flexibility.  (20) minutes of Neuromuscular Re-Education (NMR)  to improve: Balance, Coordination, Kinesthetic, Sense, Proprioception, and Posture.--  (16) minutes of Therapeutic Activities (TA) to improve functional performance.  Vasopneumatic Device Therapy () for management of swelling/edema. (70275)  Unattended Electrical Stimulation (ES) for muscle performance or pain modulation.  BFR: Blood flow restriction applied during exercise      Patient Education and Home Exercises       Home Exercises Provided and Patient Education Provided     Education provided: (time included with treatment) minutes  PURPOSE: Patient educated on the impairments noted above and the effects of physical therapy intervention to improve overall condition and QOL.   EXERCISE: Patient was educated on all the above exercise prior/during/after for proper posture, positioning, and  execution for safe performance with home exercise program.   STRENGTH: Patient educated on the importance of improved core and extremity strength in order to improve alignment of the spine and extremities with static positions and dynamic movement.   GAIT & BALANCE: Patient educated on the importance of strong core and lower extremity musculature in order to improve both static and dynamic balance, improve gait mechanics, reduce fall risk and improve household and community mobility.     Written Home Exercises Provided: yes.  Exercises were reviewed and Corie was able to demonstrate them prior to the end of the session.  Corie demonstrated good  understanding of the education provided. See EMR under Patient Instructions for exercises provided during therapy sessions.    Assessment     Patient tolerated session well today.  Incorporated side lying straight leg raise abduction to further promote lateral hip strength.     Corie is progressing well towards her goals.   Patient prognosis is Excellent.     Patient will continue to benefit from skilled outpatient physical therapy to address the deficits listed in the problem list box on initial evaluation, provide pt/family education and to maximize patient's level of independence in the home and community environment.     Patient's spiritual, cultural and educational needs considered and pt agreeable to plan of care and goals.     Anticipated Barriers for therapy: none     Short Term Goals:  4 weeks Status  Date Met   PAIN: Pt will report worst pain of 5/10 in order to progress toward max functional ability and improve quality of life. [x] Progressing  [] Met  [] Not Met     FUNCTION: Patient will demonstrate improved function as indicated by a score of greater than or equal to 61 out of 100 on FOTO. [x] Progressing  [] Met  [] Not Met     MOBILITY: Patient will improve AROM to 50% of stated goals, listed in objective measures above, in order to progress towards  independence with functional activities.  [x] Progressing  [] Met  [] Not Met     STRENGTH: Patient will improve strength to 50% of stated goals, listed in objective measures above, in order to progress towards independence with functional activities. [x] Progressing  [] Met  [] Not Met     HEP: Patient will demonstrate independence with HEP in order to progress toward functional independence. [x] Progressing  [] Met  [] Not Met        Long Term Goals:  8 weeks Status Date Met   PAIN: Pt will report worst pain of 2/10 in order to progress toward max functional ability and improve quality of life [x] Progressing  [] Met  [] Not Met     FUNCTION: Patient will demonstrate improved function as indicated by a score of greater than or equal to 70 out of 100 on FOTO. [x] Progressing  [] Met  [] Not Met     MOBILITY: Patient will improve AROM to stated goals, listed in objective measures above, in order to return to maximal functional potential and improve quality of life.  [x] Progressing  [] Met  [] Not Met     STRENGTH: Patient will improve strength to stated goals, listed in objective measures above, in order to improve functional independence and quality of life.  [x] Progressing  [] Met  [] Not Met     GAIT: Patient will demonstrate normalized gait mechanics with minimal compensation in order to return to PLOF. [x] Progressing  [] Met  [] Not Met     Patient will return to normal ADL's, IADL's, community involvement, recreational activities, and work-related activities with less than or equal to 2/10 pain and maximal function.  [x] Progressing  [] Met  [x] Not Met        Plan   Continue Plan of Care (POC) and progress per patient tolerance. See treatment section for details on planned progressions next session.      Yanni Oh, PT

## 2025-01-03 ENCOUNTER — CLINICAL SUPPORT (OUTPATIENT)
Dept: REHABILITATION | Facility: HOSPITAL | Age: 55
End: 2025-01-03
Payer: COMMERCIAL

## 2025-01-03 DIAGNOSIS — M62.81 PROXIMAL MUSCLE WEAKNESS: ICD-10-CM

## 2025-01-03 DIAGNOSIS — Z74.09 DECREASED FUNCTIONAL MOBILITY AND ENDURANCE: Primary | ICD-10-CM

## 2025-01-03 PROCEDURE — 97140 MANUAL THERAPY 1/> REGIONS: CPT

## 2025-01-03 PROCEDURE — 97112 NEUROMUSCULAR REEDUCATION: CPT

## 2025-01-03 PROCEDURE — 97530 THERAPEUTIC ACTIVITIES: CPT

## 2025-01-03 PROCEDURE — 97110 THERAPEUTIC EXERCISES: CPT

## 2025-01-03 NOTE — PROGRESS NOTES
[facility]   Physical Therapy Treatment Note        Name: Corie Carreno  Clinic Number: [id number]    Therapy Diagnosis:   Encounter Diagnoses   Name Primary?    Decreased functional mobility and endurance Yes    Proximal muscle weakness          Physician: Juan Miguel Aj MD    Visit Date: 1/3/2025    Physician Orders: PT Eval and Treat  Medical Diagnosis from Referral: Sciatica, unspecified laterality [M54.30]   Evaluation Date: 8/7/2024  Authorization Period Expiration: 8/7/2025   Plan of Care Expiration: 2/13/2025  Progress Note Due: 1/13/2025  Visit # / Visits authorized: 1/6 (+24 from previous authorization)  FOTO: 1/3 (last performed on 8/7/2024)     Precautions: Standard     Time In: 1230  Time Out: 1332  Total Billable Time: 56 minutes (Billing reflects 1 on 1 treatment time spent with patient)    Subjective     Patient reports:Her hip has been feeling tight since last session which she attributes to performing single leg stance. States she can tell she is getting much stronger because its easier to walk up the stairs from her garage     He/She was compliant with home exercise program.  Response to previous treatment: significant decrease in pain for ~ 1 day   Functional change: no significant changes noted     Pain: NT/10     Location: R hip to posterior thigh     Objective      Objective Measures updated at progress report or POC update only unless otherwise noted.        Treatment     Corie received the treatments listed below:           CPT Intervention Performed   Today Duration / Intensity   MT Soft tissue mobilization    R glutes and piriformis      Functional Dry Needling  Palpation Assessment to determine the necessity for  Functional Dry Needling with electrical stimulation.   See EMR under MEDIA for written consent  provided on 9/23/2024  x R glutes and piriformis    TE Recumbent bike  x Level 3 for 5 mins      Hamstring stretch - strap   2x1 min  on R      Single knee  to chest -strap   30s      Lower trunk rotations- figure 4  x 2 mins, performed b      Gastrocnemius stretch   2x30s      Soleus stretch    2x30s b    NMR Posterior pelvic tilt    3 mins x 5s holds     Abdominal bracing - with biofeedback    3 mins x 5s holds, cueing to prevent breath holding      Straight leg raise flexion    2x10 b      Straight leg raise abduction  x 1x10 b      Side lying clamshells  x 2 mins x 5s holds      Seated hip IR    Blue band 3x10      Single leg stance   3x30s b      Sciatic nerve - slump position      seated: 2 mins x 5s holds each direction, performed b     Bridge + posterior pelvic tilt  x 3 mins  with 5s holds     Steamboats    2x10    TA Leg press - seat angle 5  x       Double le# 3x10   Single le# 1x10 b                     65# 2x10  b      Step ups:  x 6 inch, 2x10 b      Knee extensions    Double le# 3x12     Hamstring curls    Double le# 3x12     Box squats    24 inch 1x10 then discontinued      Side stepping with band   x Red band, 4 laps along mirror    PLAN Step ups              CPT Codes available for Billing:   (20) minutes of Manual therapy (MT) to improve pain and ROM.  (08) minutes of Therapeutic Exercise (TE) to develop strength, endurance, range of motion, and flexibility.  (12) minutes of Neuromuscular Re-Education (NMR)  to improve: Balance, Coordination, Kinesthetic, Sense, Proprioception, and Posture.--  (16) minutes of Therapeutic Activities (TA) to improve functional performance.  Vasopneumatic Device Therapy () for management of swelling/edema. (93415)  Unattended Electrical Stimulation (ES) for muscle performance or pain modulation.  BFR: Blood flow restriction applied during exercise        Patient Education and Home Exercises       Home Exercises Provided and Patient Education Provided     Education provided: (time included with treatment) minutes  PURPOSE: Patient educated on the impairments noted above and the effects of physical  therapy intervention to improve overall condition and QOL.   EXERCISE: Patient was educated on all the above exercise prior/during/after for proper posture, positioning, and execution for safe performance with home exercise program.   STRENGTH: Patient educated on the importance of improved core and extremity strength in order to improve alignment of the spine and extremities with static positions and dynamic movement.   GAIT & BALANCE: Patient educated on the importance of strong core and lower extremity musculature in order to improve both static and dynamic balance, improve gait mechanics, reduce fall risk and improve household and community mobility.     Written Home Exercises Provided: yes.  Exercises were reviewed and Corie was able to demonstrate them prior to the end of the session.  Corie demonstrated good  understanding of the education provided. See EMR under Patient Instructions for exercises provided during therapy sessions.    Assessment     Patient tolerated session well today.  It was determined that this patient would benefit from the use of functional dry needling after being cleared for all contraindications. Verbal and written consent obtained. Patient demonstrated appropriate response to Functional Dry Needling with good rhythmical contractions observed with estim to treated muscle groups. Deferred single leg press due to increased symptoms following previous session     Corie is progressing well towards her goals.   Patient prognosis is Excellent.     Patient will continue to benefit from skilled outpatient physical therapy to address the deficits listed in the problem list box on initial evaluation, provide pt/family education and to maximize patient's level of independence in the home and community environment.     Patient's spiritual, cultural and educational needs considered and pt agreeable to plan of care and goals.     Anticipated Barriers for therapy: none     Short Term Goals:  4 weeks  Status  Date Met   PAIN: Pt will report worst pain of 5/10 in order to progress toward max functional ability and improve quality of life. [x] Progressing  [] Met  [] Not Met     FUNCTION: Patient will demonstrate improved function as indicated by a score of greater than or equal to 61 out of 100 on FOTO. [x] Progressing  [] Met  [] Not Met     MOBILITY: Patient will improve AROM to 50% of stated goals, listed in objective measures above, in order to progress towards independence with functional activities.  [x] Progressing  [] Met  [] Not Met     STRENGTH: Patient will improve strength to 50% of stated goals, listed in objective measures above, in order to progress towards independence with functional activities. [x] Progressing  [] Met  [] Not Met     HEP: Patient will demonstrate independence with HEP in order to progress toward functional independence. [x] Progressing  [] Met  [] Not Met        Long Term Goals:  8 weeks Status Date Met   PAIN: Pt will report worst pain of 2/10 in order to progress toward max functional ability and improve quality of life [x] Progressing  [] Met  [] Not Met     FUNCTION: Patient will demonstrate improved function as indicated by a score of greater than or equal to 70 out of 100 on FOTO. [x] Progressing  [] Met  [] Not Met     MOBILITY: Patient will improve AROM to stated goals, listed in objective measures above, in order to return to maximal functional potential and improve quality of life.  [x] Progressing  [] Met  [] Not Met     STRENGTH: Patient will improve strength to stated goals, listed in objective measures above, in order to improve functional independence and quality of life.  [x] Progressing  [] Met  [] Not Met     GAIT: Patient will demonstrate normalized gait mechanics with minimal compensation in order to return to PLOF. [x] Progressing  [] Met  [] Not Met     Patient will return to normal ADL's, IADL's, community involvement, recreational activities, and  work-related activities with less than or equal to 2/10 pain and maximal function.  [x] Progressing  [] Met  [x] Not Met        Plan   Continue Plan of Care (POC) and progress per patient tolerance. See treatment section for details on planned progressions next session.      Yanni Oh, PT

## 2025-01-10 ENCOUNTER — TELEPHONE (OUTPATIENT)
Dept: PAIN MEDICINE | Facility: CLINIC | Age: 55
End: 2025-01-10
Payer: COMMERCIAL

## 2025-01-10 ENCOUNTER — PATIENT MESSAGE (OUTPATIENT)
Dept: PHYSICAL MEDICINE AND REHAB | Facility: CLINIC | Age: 55
End: 2025-01-10
Payer: COMMERCIAL

## 2025-01-10 NOTE — TELEPHONE ENCOUNTER
Reached out to patient to reschedule appointment because the provider will be out of clinic.  Apt has been made . All questions answered.     Eric Loredo  Medical

## 2025-01-15 ENCOUNTER — CLINICAL SUPPORT (OUTPATIENT)
Dept: REHABILITATION | Facility: HOSPITAL | Age: 55
End: 2025-01-15
Payer: COMMERCIAL

## 2025-01-15 DIAGNOSIS — Z74.09 DECREASED FUNCTIONAL MOBILITY AND ENDURANCE: Primary | ICD-10-CM

## 2025-01-15 DIAGNOSIS — M62.81 PROXIMAL MUSCLE WEAKNESS: ICD-10-CM

## 2025-01-15 PROCEDURE — 97110 THERAPEUTIC EXERCISES: CPT

## 2025-01-15 PROCEDURE — 97530 THERAPEUTIC ACTIVITIES: CPT

## 2025-01-15 PROCEDURE — 97140 MANUAL THERAPY 1/> REGIONS: CPT

## 2025-01-15 PROCEDURE — 97112 NEUROMUSCULAR REEDUCATION: CPT

## 2025-01-17 ENCOUNTER — OFFICE VISIT (OUTPATIENT)
Dept: PHYSICAL MEDICINE AND REHAB | Facility: CLINIC | Age: 55
End: 2025-01-17
Payer: COMMERCIAL

## 2025-01-17 VITALS — HEIGHT: 68 IN | BODY MASS INDEX: 44.41 KG/M2 | WEIGHT: 293 LBS

## 2025-01-17 DIAGNOSIS — M70.71 BURSITIS OF OTHER BURSA OF RIGHT HIP: ICD-10-CM

## 2025-01-17 DIAGNOSIS — M79.18 DIFFUSE MYOFASCIAL PAIN SYNDROME: Primary | ICD-10-CM

## 2025-01-17 PROCEDURE — 1160F RVW MEDS BY RX/DR IN RCRD: CPT | Mod: CPTII,S$GLB,, | Performed by: PHYSICAL MEDICINE & REHABILITATION

## 2025-01-17 PROCEDURE — 99214 OFFICE O/P EST MOD 30 MIN: CPT | Mod: 25,S$GLB,, | Performed by: PHYSICAL MEDICINE & REHABILITATION

## 2025-01-17 PROCEDURE — 3008F BODY MASS INDEX DOCD: CPT | Mod: CPTII,S$GLB,, | Performed by: PHYSICAL MEDICINE & REHABILITATION

## 2025-01-17 PROCEDURE — 99999 PR PBB SHADOW E&M-EST. PATIENT-LVL III: CPT | Mod: PBBFAC,,, | Performed by: PHYSICAL MEDICINE & REHABILITATION

## 2025-01-17 PROCEDURE — 20553 NJX 1/MLT TRIGGER POINTS 3/>: CPT | Mod: S$GLB,,, | Performed by: PHYSICAL MEDICINE & REHABILITATION

## 2025-01-17 PROCEDURE — 1159F MED LIST DOCD IN RCRD: CPT | Mod: CPTII,S$GLB,, | Performed by: PHYSICAL MEDICINE & REHABILITATION

## 2025-01-17 NOTE — PROGRESS NOTES
PM&R CLINCI NOTE  Chief Complaint   Patient presents with    Muscle Pain       HPI: This is a 54 y.o.  female being seen in clinic today for repeat TPI for piriformis pain.  She continues to improve with PT and has a few spots that are still bothering her.     History obtained from patient    Functional History:  Walking: Not limited  Transfers: Independent  Assistive devices: No  Power mobility: No  Falls: None     Needs help with:  Nothing - all ADLS normal       Past family, medical, social, and surgical history reviewed in chart    Review of Systems:     General- denies lethargy, weight change, fever, chills  Head/neck- denies swallowing difficulties  ENT- denies hearing changes  Cardiovascular-denies chest pain  Pulmonary- denies shortness of breath  GI- denies constipation or bowel incontinence  - denies bladder incontinence  Skin- denies wounds or rashes  Musculoskeletal- denies weakness, + pain  Neurologic- denies numbness and tingling  Psychiatric- denies depressive or psychotic features, denies anxiety  Lymphatic-denies swelling  Endocrine- denies hypoglycemic symptoms/DM history  All other pertinent systems negative     Physical Examination:  General: Well developed, well nourished female, NAD  HEENT:NCAT EOMI bilaterally   Pulmonary:Normal respirations    Spinal Examination: CERVICAL  Active ROM is within normal limits.  Inspection: No deformity of spinal alignment.    Spinal Examination: LUMBAR or THORACIC  Active ROM is within normal limits.  Inspection: No deformity of spinal alignment.  No palpable olisthesis.  Palpation:  Ttp at si joints, ttp at piriformis on right and gluteus medius    Bilateral Upper and Lower Extremities:  Pulses are 2+ at radial, bilaterally.  Shoulder/Elbow/Wrist/Hand ROM   Hip/Knee/Ankle ROM wnl  Bilateral Extremities show normal capillary refill.  No signs of cyanosis, rubor, edema, skin changes, or dysvascular changes of appendages.  Nails appear intact.    Neurological  Exam:  Cranial Nerves:  II-XII grossly intact    Manual Muscle Testing: (Motor 5=normal)  RIGHT Lower extremity: Hip flexion 5/5, Hip Abduction 5/5, Hip Adduction 5/5, Knee extension 5/5, Knee flexion 5/5, Ankle dorsiflexion 5/5, Extensor hallucis longus 5/5, Ankle plantarflexion 5/5  LEFT Lower extremity:  Hip flexion 5/5, Hip Abduction 5/5,Hip Adduction 5/5, Knee extension 5/5, Knee flexion 5/5, Ankle dorsiflexion 5/5, Extensor hallucis longus 5/5, Ankle plantarflexion 5/5    No focal atrophy is noted of either upper or lower extremity.    Bilateral Reflexes:  No clonus at knee or ankle.    Sensation: tested to light touch  - intact in legs    Gait: Narrow base and good arm swing.      IMPRESSION/PLAN: This is a 54 y.o.  female with right piriformis syndrome, Lumbar DJD/DDD  Discussed in detail for 30 minutes about diagnosis and treatment plan    Cont PT  2. injections again today  3. cont zanaflex 2mg QHS prn  4. Fu prn    Aixa Zamora M.D.  Physical Medicine and Rehab      PROCEDURE NOTE    Diagnosis: Myofascial pain  Procedure: trigger point injections to right piriformis and gluteus medius     Risks and benefits of procedure explained to patient including risks of infection, bleeding, pain, or damage to surrounding tissues. All questions answered. Informed consent obtained prior to proceeding. Areas marked and prepped in sterile fashion. Using a 27g 1.25inch needle, 8cc of 1% lidocaine was injected evenly into the above mentioned muscles. None to minimal bleeding noted. ER and post injection instructions given.    Aixa Zamora M.D.

## 2025-01-19 NOTE — PROGRESS NOTES
[facility]   Physical Therapy Treatment Note        Name: Corie Carreno  Clinic Number: [id number]    Therapy Diagnosis:   Encounter Diagnoses   Name Primary?    Decreased functional mobility and endurance Yes    Proximal muscle weakness          Physician: Juan Miguel Aj MD    Visit Date: 1/15/2025    Physician Orders: PT Eval and Treat  Medical Diagnosis from Referral: Sciatica, unspecified laterality [M54.30]   Evaluation Date: 8/7/2024  Authorization Period Expiration: 8/7/2025   Plan of Care Expiration: 2/13/2025  Progress Note Due: 1/13/2025  Visit # / Visits authorized: 2/6 (+24 from previous authorization)  FOTO: 1/3 (last performed on 8/7/2024)     Precautions: Standard     Time In: 1130  Time Out: 1233  Total Billable Time: 55 minutes (Billing reflects 1 on 1 treatment time spent with patient)    Subjective     Patient reports:She has improved ~50% but continues to have muscle tension in the hip     He/She was compliant with home exercise program.  Response to previous treatment: significant decrease in pain for ~ 1 day   Functional change: no significant changes noted     Pain: NT/10     Location: R hip to posterior thigh     Objective      Objective Measures updated at progress report or POC update only unless otherwise noted.        Treatment     Corie received the treatments listed below:           CPT Intervention Performed   Today Duration / Intensity   MT Soft tissue mobilization    R glutes and piriformis      Functional Dry Needling  Palpation Assessment to determine the necessity for  Functional Dry Needling with electrical stimulation.   See EMR under MEDIA for written consent  provided on 9/23/2024  x R glutes and piriformis    TE Recumbent bike  x Level 3 for 6 mins      Hamstring stretch - strap   2x1 min  on R      Single knee to chest -strap   30s      Lower trunk rotations- figure 4  x 2 mins, performed b      Gastrocnemius stretch   2x30s      Soleus stretch     2x30s b    NMR Posterior pelvic tilt    3 mins x 5s holds     Abdominal bracing - with biofeedback    3 mins x 5s holds, cueing to prevent breath holding      Straight leg raise flexion    2x10 b      Straight leg raise abduction  x 15x b      Side lying clamshells  x 15x b      Seated hip IR    Blue band 3x10      Single leg stance   3x30s b      Sciatic nerve - slump position      seated: 2 mins x 5s holds each direction, performed b     Bridge + posterior pelvic tilt  x 10# 3x10      Steamboats    2x10    TA Leg press - seat angle 5  x       Double le# 3x10   Single le# 1x10 b                     65# 2x10  b      Step ups x 6 inch, 3x8 b      Knee extensions    Double le# 3x12     Hamstring curls    Double le# 3x12     Box squats    24 inch 1x10 then discontinued      Side stepping with band   x Pink loop, 4 laps along mirror    PLAN Step ups              CPT Codes available for Billing:   (15) minutes of Manual therapy (MT) to improve pain and ROM.  (10) minutes of Therapeutic Exercise (TE) to develop strength, endurance, range of motion, and flexibility.  (15) minutes of Neuromuscular Re-Education (NMR)  to improve: Balance, Coordination, Kinesthetic, Sense, Proprioception, and Posture.--  (15) minutes of Therapeutic Activities (TA) to improve functional performance.  Vasopneumatic Device Therapy () for management of swelling/edema. (72464)  Unattended Electrical Stimulation (ES) for muscle performance or pain modulation.  BFR: Blood flow restriction applied during exercise        Patient Education and Home Exercises       Home Exercises Provided and Patient Education Provided     Education provided: (time included with treatment) minutes  PURPOSE: Patient educated on the impairments noted above and the effects of physical therapy intervention to improve overall condition and QOL.   EXERCISE: Patient was educated on all the above exercise prior/during/after for proper posture, positioning,  and execution for safe performance with home exercise program.   STRENGTH: Patient educated on the importance of improved core and extremity strength in order to improve alignment of the spine and extremities with static positions and dynamic movement.   GAIT & BALANCE: Patient educated on the importance of strong core and lower extremity musculature in order to improve both static and dynamic balance, improve gait mechanics, reduce fall risk and improve household and community mobility.     Written Home Exercises Provided: yes.  Exercises were reviewed and Corie was able to demonstrate them prior to the end of the session.  Corie demonstrated good  understanding of the education provided. See EMR under Patient Instructions for exercises provided during therapy sessions.    Assessment     Patient tolerated session well today.  Increased reps of straight leg raise abduction, and step ups to improve hip strength and endurance. It was determined that this patient would benefit from the use of functional dry needling after being cleared for all contraindications. Verbal and written consent obtained. Patient demonstrated appropriate response to Functional Dry Needling with fair rhythmical contractions observed with estim to treated muscle groups.     Corie is progressing well towards her goals.   Patient prognosis is Excellent.     Patient will continue to benefit from skilled outpatient physical therapy to address the deficits listed in the problem list box on initial evaluation, provide pt/family education and to maximize patient's level of independence in the home and community environment.     Patient's spiritual, cultural and educational needs considered and pt agreeable to plan of care and goals.     Anticipated Barriers for therapy: none     Short Term Goals:  4 weeks Status  Date Met   PAIN: Pt will report worst pain of 5/10 in order to progress toward max functional ability and improve quality of life. [x]  Progressing  [] Met  [] Not Met     FUNCTION: Patient will demonstrate improved function as indicated by a score of greater than or equal to 61 out of 100 on FOTO. [x] Progressing  [] Met  [] Not Met     MOBILITY: Patient will improve AROM to 50% of stated goals, listed in objective measures above, in order to progress towards independence with functional activities.  [x] Progressing  [] Met  [] Not Met     STRENGTH: Patient will improve strength to 50% of stated goals, listed in objective measures above, in order to progress towards independence with functional activities. [x] Progressing  [] Met  [] Not Met     HEP: Patient will demonstrate independence with HEP in order to progress toward functional independence. [x] Progressing  [] Met  [] Not Met        Long Term Goals:  8 weeks Status Date Met   PAIN: Pt will report worst pain of 2/10 in order to progress toward max functional ability and improve quality of life [x] Progressing  [] Met  [] Not Met     FUNCTION: Patient will demonstrate improved function as indicated by a score of greater than or equal to 70 out of 100 on FOTO. [x] Progressing  [] Met  [] Not Met     MOBILITY: Patient will improve AROM to stated goals, listed in objective measures above, in order to return to maximal functional potential and improve quality of life.  [x] Progressing  [] Met  [] Not Met     STRENGTH: Patient will improve strength to stated goals, listed in objective measures above, in order to improve functional independence and quality of life.  [x] Progressing  [] Met  [] Not Met     GAIT: Patient will demonstrate normalized gait mechanics with minimal compensation in order to return to PLOF. [x] Progressing  [] Met  [] Not Met     Patient will return to normal ADL's, IADL's, community involvement, recreational activities, and work-related activities with less than or equal to 2/10 pain and maximal function.  [x] Progressing  [] Met  [x] Not Met        Plan   Continue Plan of  Care (POC) and progress per patient tolerance. See treatment section for details on planned progressions next session.      Yanni Oh PT

## 2025-03-24 ENCOUNTER — OFFICE VISIT (OUTPATIENT)
Dept: PHYSICAL MEDICINE AND REHAB | Facility: CLINIC | Age: 55
End: 2025-03-24
Payer: COMMERCIAL

## 2025-03-24 VITALS — RESPIRATION RATE: 14 BRPM | BODY MASS INDEX: 44.41 KG/M2 | HEIGHT: 68 IN | WEIGHT: 293 LBS

## 2025-03-24 DIAGNOSIS — M70.71 BURSITIS OF OTHER BURSA OF RIGHT HIP: ICD-10-CM

## 2025-03-24 DIAGNOSIS — G57.01 PIRIFORMIS SYNDROME, RIGHT: Primary | ICD-10-CM

## 2025-03-24 PROCEDURE — 99999 PR PBB SHADOW E&M-EST. PATIENT-LVL III: CPT | Mod: PBBFAC,,, | Performed by: PHYSICAL MEDICINE & REHABILITATION

## 2025-03-24 PROCEDURE — 20552 NJX 1/MLT TRIGGER POINT 1/2: CPT | Mod: S$GLB,,, | Performed by: PHYSICAL MEDICINE & REHABILITATION

## 2025-03-24 PROCEDURE — 3008F BODY MASS INDEX DOCD: CPT | Mod: CPTII,S$GLB,, | Performed by: PHYSICAL MEDICINE & REHABILITATION

## 2025-03-24 PROCEDURE — 4010F ACE/ARB THERAPY RXD/TAKEN: CPT | Mod: CPTII,S$GLB,, | Performed by: PHYSICAL MEDICINE & REHABILITATION

## 2025-03-24 PROCEDURE — 1160F RVW MEDS BY RX/DR IN RCRD: CPT | Mod: CPTII,S$GLB,, | Performed by: PHYSICAL MEDICINE & REHABILITATION

## 2025-03-24 PROCEDURE — 1159F MED LIST DOCD IN RCRD: CPT | Mod: CPTII,S$GLB,, | Performed by: PHYSICAL MEDICINE & REHABILITATION

## 2025-03-24 PROCEDURE — 99214 OFFICE O/P EST MOD 30 MIN: CPT | Mod: 25,S$GLB,, | Performed by: PHYSICAL MEDICINE & REHABILITATION

## 2025-03-24 RX ORDER — METHYLPREDNISOLONE 4 MG/1
TABLET ORAL
Qty: 1 EACH | Refills: 0 | Status: SHIPPED | OUTPATIENT
Start: 2025-03-24 | End: 2025-04-14

## 2025-03-24 NOTE — PROGRESS NOTES
PM&R CLINCI NOTE    Chief Complaint   Patient presents with    Hip Pain    Muscle Pain       HPI: This is a 54 y.o.  female being seen in clinic today for repeat TPI for piriformis pain.  She is improving but still having tightness..     History obtained from patient    Functional History:  Walking: Not limited  Transfers: Independent  Assistive devices: No  Power mobility: No  Falls: None     Needs help with:  Nothing - all ADLS normal       Past family, medical, social, and surgical history reviewed in chart    Review of Systems:     General- denies lethargy, weight change, fever, chills  Head/neck- denies swallowing difficulties  ENT- denies hearing changes  Cardiovascular-denies chest pain  Pulmonary- denies shortness of breath  GI- denies constipation or bowel incontinence  - denies bladder incontinence  Skin- denies wounds or rashes  Musculoskeletal- denies weakness, + pain  Neurologic- denies numbness and tingling  Psychiatric- denies depressive or psychotic features, denies anxiety  Lymphatic-denies swelling  Endocrine- denies hypoglycemic symptoms/DM history  All other pertinent systems negative     Physical Examination:  General: Well developed, well nourished female, NAD  HEENT:NCAT EOMI bilaterally   Pulmonary:Normal respirations    Spinal Examination: CERVICAL  Active ROM is within normal limits.  Inspection: No deformity of spinal alignment.    Spinal Examination: LUMBAR or THORACIC  Active ROM is within normal limits.  Inspection: No deformity of spinal alignment.  No palpable olisthesis.  Palpation:  Ttp at si joints, ttp at piriformis on right and gluteus medius    Bilateral Upper and Lower Extremities:  Pulses are 2+ at radial, bilaterally.  Shoulder/Elbow/Wrist/Hand ROM   Hip/Knee/Ankle ROM wnl  Bilateral Extremities show normal capillary refill.  No signs of cyanosis, rubor, edema, skin changes, or dysvascular changes of appendages.  Nails appear intact.    Neurological Exam:  Cranial  Nerves:  II-XII grossly intact    Manual Muscle Testing: (Motor 5=normal)  RIGHT Lower extremity: Hip flexion 5/5, Hip Abduction 5/5, Hip Adduction 5/5, Knee extension 5/5, Knee flexion 5/5, Ankle dorsiflexion 5/5, Extensor hallucis longus 5/5, Ankle plantarflexion 5/5  LEFT Lower extremity:  Hip flexion 5/5, Hip Abduction 5/5,Hip Adduction 5/5, Knee extension 5/5, Knee flexion 5/5, Ankle dorsiflexion 5/5, Extensor hallucis longus 5/5, Ankle plantarflexion 5/5    No focal atrophy is noted of either upper or lower extremity.    Bilateral Reflexes:  No clonus at knee or ankle.    Sensation: tested to light touch  - intact in legs    Gait: Narrow base and good arm swing.      IMPRESSION/PLAN: This is a 54 y.o.  female with right piriformis syndrome, Lumbar DJD/DDD  Discussed in detail for 30 minutes about diagnosis and treatment plan    Cont PT  2. injections again today  3. cont zanaflex 2mg QHS prn, steroid pack   4. Fu prn    Aixa Zamora M.D.  Physical Medicine and Rehab      PROCEDURE NOTE    Diagnosis: Myofascial pain  Procedure: trigger point injections to right piriformis and gluteus medius     Risks and benefits of procedure explained to patient including risks of infection, bleeding, pain, or damage to surrounding tissues. All questions answered. Informed consent obtained prior to proceeding. Areas marked and prepped in sterile fashion. Using a 27g 1.25inch needle, 8cc of 1% lidocaine was injected evenly into the above mentioned muscles. None to minimal bleeding noted. ER and post injection instructions given.    Aixa Zamora M.D.

## 2025-05-09 ENCOUNTER — PATIENT MESSAGE (OUTPATIENT)
Dept: PHYSICAL MEDICINE AND REHAB | Facility: CLINIC | Age: 55
End: 2025-05-09
Payer: COMMERCIAL

## 2025-05-09 ENCOUNTER — OFFICE VISIT (OUTPATIENT)
Dept: DERMATOLOGY | Facility: CLINIC | Age: 55
End: 2025-05-09
Payer: COMMERCIAL

## 2025-05-09 DIAGNOSIS — L30.4 INTERTRIGO: Primary | ICD-10-CM

## 2025-05-09 PROCEDURE — 99999 PR PBB SHADOW E&M-EST. PATIENT-LVL III: CPT | Mod: PBBFAC,,, | Performed by: STUDENT IN AN ORGANIZED HEALTH CARE EDUCATION/TRAINING PROGRAM

## 2025-05-09 RX ORDER — KETOCONAZOLE 20 MG/G
CREAM TOPICAL 2 TIMES DAILY
Qty: 60 G | Refills: 1 | Status: SHIPPED | OUTPATIENT
Start: 2025-05-09

## 2025-05-09 RX ORDER — FLUCONAZOLE 200 MG/1
200 TABLET ORAL WEEKLY
Qty: 4 TABLET | Refills: 0 | Status: SHIPPED | OUTPATIENT
Start: 2025-05-09 | End: 2025-06-08

## 2025-05-09 RX ORDER — TRIAMCINOLONE ACETONIDE 0.25 MG/G
CREAM TOPICAL
Qty: 80 G | Refills: 1 | Status: SHIPPED | OUTPATIENT
Start: 2025-05-09

## 2025-05-09 NOTE — PROGRESS NOTES
Subjective:       Patient ID:  Corie Carreno is a 54 y.o. female who presents for   Chief Complaint   Patient presents with    Hidradenitis Suppurativa     Intertrigo , possible hs,and to get established , inflamed hair follicle      History of Present Illness: The patient presents with chief complaint of recurring intertrigo/rash.  Location: under the abdominal fold and on the right chest/flank area   Duration: ongoing for months  Signs/Symptoms: red, irritated, sometimes painful rash. Some areas with skin breakdown and others with thickened areas. Also has 2 possible ingrown hairs in the area with painful bumps.   Prior treatments: nystatin cream and triple ointment paste for barrier protection.       Hidradenitis Suppurativa        Review of Systems   Constitutional:  Negative for fever and chills.   Skin:  Positive for itching, rash and dry skin.        Objective:    Physical Exam   Constitutional: She appears well-developed and well-nourished. No distress.   Neurological: She is alert and oriented to person, place, and time. She is not disoriented.   Psychiatric: She has a normal mood and affect.   Skin:   Areas Examined (abnormalities noted in diagram):   Head / Face Inspection Performed  Neck Inspection Performed  Chest / Axilla Inspection Performed  Abdomen Inspection Performed  Genitals / Buttocks / Groin Inspection Performed  RUE Inspected  LUE Inspection Performed              Diagram Legend     Erythematous scaling macule/papule c/w actinic keratosis       Vascular papule c/w angioma      Pigmented verrucoid papule/plaque c/w seborrheic keratosis      Yellow umbilicated papule c/w sebaceous hyperplasia      Irregularly shaped tan macule c/w lentigo     1-2 mm smooth white papules consistent with Milia      Movable subcutaneous cyst with punctum c/w epidermal inclusion cyst      Subcutaneous movable cyst c/w pilar cyst      Firm pink to brown papule c/w dermatofibroma      Pedunculated fleshy papule(s)  c/w skin tag(s)      Evenly pigmented macule c/w junctional nevus     Mildly variegated pigmented, slightly irregular-bordered macule c/w mildly atypical nevus      Flesh colored to evenly pigmented papule c/w intradermal nevus       Pink pearly papule/plaque c/w basal cell carcinoma      Erythematous hyperkeratotic cursted plaque c/w SCC      Surgical scar with no sign of skin cancer recurrence      Open and closed comedones      Inflammatory papules and pustules      Verrucoid papule consistent consistent with wart     Erythematous eczematous patches and plaques     Dystrophic onycholytic nail with subungual debris c/w onychomycosis     Umbilicated papule    Erythematous-base heme-crusted tan verrucoid plaque consistent with inflamed seborrheic keratosis     Erythematous Silvery Scaling Plaque c/w Psoriasis     See annotation      Assessment / Plan:        Intertrigo  -     ketoconazole (NIZORAL) 2 % cream; Apply topically 2 (two) times daily.  Dispense: 60 g; Refill: 1  -     triamcinolone acetonide 0.025% (KENALOG) 0.025 % cream; AAA bid. Steroid cream  Dispense: 80 g; Refill: 1  -     fluconazole (DIFLUCAN) 200 MG Tab; Take 1 tablet (200 mg total) by mouth once a week.  Dispense: 4 tablet; Refill: 0             Follow up in about 8 weeks (around 7/4/2025).

## 2025-05-23 ENCOUNTER — PATIENT MESSAGE (OUTPATIENT)
Dept: DERMATOLOGY | Facility: CLINIC | Age: 55
End: 2025-05-23
Payer: COMMERCIAL

## 2025-05-27 NOTE — TELEPHONE ENCOUNTER
That's good things are improving. Usually once the redness and irritation are gone, then go ahead and stop. Now, if it is just darkened discoloration, that may take longer to resolve, but it's fine as long as it's not irritated to stop the medication. If the issue hasn't completely resolved in like 6-8 weeks, let us know.

## 2025-06-03 DIAGNOSIS — L30.4 INTERTRIGO: ICD-10-CM

## 2025-06-05 RX ORDER — FLUCONAZOLE 200 MG/1
200 TABLET ORAL WEEKLY
Qty: 4 TABLET | Refills: 0 | Status: SHIPPED | OUTPATIENT
Start: 2025-06-05 | End: 2025-07-05

## 2025-06-23 ENCOUNTER — OFFICE VISIT (OUTPATIENT)
Dept: PHYSICAL MEDICINE AND REHAB | Facility: CLINIC | Age: 55
End: 2025-06-23
Payer: COMMERCIAL

## 2025-06-23 VITALS — WEIGHT: 293 LBS | BODY MASS INDEX: 44.41 KG/M2 | HEIGHT: 68 IN | RESPIRATION RATE: 13 BRPM

## 2025-06-23 DIAGNOSIS — M53.82 MUSCULOSKELETAL DISORDER OF THE SUBOCCIPITAL: ICD-10-CM

## 2025-06-23 DIAGNOSIS — M79.18 DIFFUSE MYOFASCIAL PAIN SYNDROME: Primary | ICD-10-CM

## 2025-06-23 DIAGNOSIS — M75.80 ROTATOR CUFF TENDINITIS, UNSPECIFIED LATERALITY: ICD-10-CM

## 2025-06-23 PROCEDURE — 4010F ACE/ARB THERAPY RXD/TAKEN: CPT | Mod: CPTII,S$GLB,, | Performed by: PHYSICAL MEDICINE & REHABILITATION

## 2025-06-23 PROCEDURE — 20553 NJX 1/MLT TRIGGER POINTS 3/>: CPT | Mod: S$GLB,,, | Performed by: PHYSICAL MEDICINE & REHABILITATION

## 2025-06-23 PROCEDURE — 99999 PR PBB SHADOW E&M-EST. PATIENT-LVL III: CPT | Mod: PBBFAC,,, | Performed by: PHYSICAL MEDICINE & REHABILITATION

## 2025-06-23 PROCEDURE — 99214 OFFICE O/P EST MOD 30 MIN: CPT | Mod: 25,S$GLB,, | Performed by: PHYSICAL MEDICINE & REHABILITATION

## 2025-06-23 PROCEDURE — 3008F BODY MASS INDEX DOCD: CPT | Mod: CPTII,S$GLB,, | Performed by: PHYSICAL MEDICINE & REHABILITATION

## 2025-06-23 PROCEDURE — 1159F MED LIST DOCD IN RCRD: CPT | Mod: CPTII,S$GLB,, | Performed by: PHYSICAL MEDICINE & REHABILITATION

## 2025-06-23 PROCEDURE — 1160F RVW MEDS BY RX/DR IN RCRD: CPT | Mod: CPTII,S$GLB,, | Performed by: PHYSICAL MEDICINE & REHABILITATION

## 2025-06-23 NOTE — PROGRESS NOTES
PM&R CLINCI NOTE    Chief Complaint   Patient presents with    Muscle Pain    Shoulder Pain    Neck Pain       HPI: This is a 54 y.o.  female being seen in clinic today for repeat TPI due to tightness in her neck and shoulders. She admits to stress/tension.  Massage has provided some relief.  History obtained from patient    Functional History:  Walking: Not limited  Transfers: Independent  Assistive devices: No  Power mobility: No  Falls: None     Needs help with:  Nothing - all ADLS normal       Past family, medical, social, and surgical history reviewed in chart    Review of Systems:     General- denies lethargy, weight change, fever, chills  Head/neck- denies swallowing difficulties  ENT- denies hearing changes  Cardiovascular-denies chest pain  Pulmonary- denies shortness of breath  GI- denies constipation or bowel incontinence  - denies bladder incontinence  Skin- denies wounds or rashes  Musculoskeletal- denies weakness, + pain  Neurologic- denies numbness and tingling  Psychiatric- denies depressive or psychotic features, denies anxiety  Lymphatic-denies swelling  Endocrine- denies hypoglycemic symptoms/DM history  All other pertinent systems negative     Physical Examination:  General: Well developed, well nourished female, NAD  HEENT:NCAT EOMI bilaterally   Pulmonary:Normal respirations    Spinal Examination: CERVICAL  Active ROM is within normal limits.  Inspection: No deformity of spinal alignment.  Tight and ttp at trapezius, levator scapula, teres minor, rhomboids.  Local twitch at bilateral trapezius    Spinal Examination: LUMBAR or THORACIC  Active ROM is within normal limits.  Inspection: No deformity of spinal alignment.  No palpable olisthesis.    Bilateral Upper and Lower Extremities:  Pulses are 2+ at radial, bilaterally.  Shoulder/Elbow/Wrist/Hand ROM wnl  Hip/Knee/Ankle ROM wnl  Bilateral Extremities show normal capillary refill.  No signs of cyanosis, rubor, edema, skin changes, or  dysvascular changes of appendages.  Nails appear intact.    Neurological Exam:  Cranial Nerves:  II-XII grossly intact    Manual Muscle Testing: (Motor 5=normal)  5/5 upper ext    No focal atrophy is noted of either upper extremity.    Bilateral Reflexes:  Hoffmans neg    Sensation: tested to light touch  - intact in arms    Gait: Narrow base and good arm swing.      IMPRESSION/PLAN: This is a 54 y.o.  female with myofascial pain, h/o right piriformis syndrome-stable Lumbar DJD/DDD  Discussed in detail for 30 minutes about diagnosis and treatment plan    Cont PT/HEP  2. TPIs today  3. cont zanaflex 2mg QHS prn, ice  4. Fu prn    Aixa Zamora M.D.  Physical Medicine and Rehab      PROCEDURE NOTE    Diagnosis: Myofascial pain  Procedure: trigger point injections to bilateral trapezius, rhomboids, levator scapula, teres minor  Risks and benefits of procedure explained to patient including risks of infection, bleeding, pain, or damage to surrounding tissues. All questions answered. Informed consent obtained prior to proceeding. Areas marked and prepped in sterile fashion. Using a 27g 1.25inch needle, 15cc of 1% lidocaine was injected evenly into the above mentioned muscles. None to minimal bleeding noted. ER and post injection instructions given.    Aixa Zamora M.D.

## 2025-06-26 DIAGNOSIS — L30.4 INTERTRIGO: ICD-10-CM

## 2025-06-26 RX ORDER — KETOCONAZOLE 20 MG/G
CREAM TOPICAL 2 TIMES DAILY
Qty: 60 G | Refills: 1 | Status: SHIPPED | OUTPATIENT
Start: 2025-06-26